# Patient Record
Sex: FEMALE | Race: WHITE | NOT HISPANIC OR LATINO | Employment: OTHER | URBAN - METROPOLITAN AREA
[De-identification: names, ages, dates, MRNs, and addresses within clinical notes are randomized per-mention and may not be internally consistent; named-entity substitution may affect disease eponyms.]

---

## 2017-05-27 ENCOUNTER — HOSPITAL ENCOUNTER (INPATIENT)
Facility: HOSPITAL | Age: 82
LOS: 2 days | Discharge: NURSING, REHAB OR LONG-TERM CARE | DRG: 871 | End: 2017-05-29
Attending: EMERGENCY MEDICINE | Admitting: EMERGENCY MEDICINE
Payer: MEDICARE

## 2017-05-27 DIAGNOSIS — Z71.89 GOALS OF CARE, COUNSELING/DISCUSSION: ICD-10-CM

## 2017-05-27 DIAGNOSIS — N17.9 AKI (ACUTE KIDNEY INJURY): ICD-10-CM

## 2017-05-27 DIAGNOSIS — R11.0 NAUSEA: ICD-10-CM

## 2017-05-27 DIAGNOSIS — I10 ESSENTIAL HYPERTENSION: ICD-10-CM

## 2017-05-27 DIAGNOSIS — R65.20 SEVERE SEPSIS: ICD-10-CM

## 2017-05-27 DIAGNOSIS — Z51.5 PALLIATIVE CARE ENCOUNTER: ICD-10-CM

## 2017-05-27 DIAGNOSIS — A41.9 SEVERE SEPSIS: ICD-10-CM

## 2017-05-27 DIAGNOSIS — E87.0 HYPERNATREMIA: ICD-10-CM

## 2017-05-27 DIAGNOSIS — R09.02 HYPOXIA: ICD-10-CM

## 2017-05-27 DIAGNOSIS — R52 PAIN: ICD-10-CM

## 2017-05-27 DIAGNOSIS — R06.03 RESPIRATORY DISTRESS: Primary | ICD-10-CM

## 2017-05-27 LAB
ABO + RH BLD: NORMAL
ALBUMIN SERPL BCP-MCNC: 3.6 G/DL
ALLENS TEST: ABNORMAL
ALP SERPL-CCNC: 69 U/L
ALT SERPL W/O P-5'-P-CCNC: 16 U/L
ANION GAP SERPL CALC-SCNC: 20 MMOL/L
APTT BLDCRRT: <21 SEC
AST SERPL-CCNC: 41 U/L
BASOPHILS # BLD AUTO: 0.02 K/UL
BASOPHILS NFR BLD: 0.2 %
BILIRUB SERPL-MCNC: 1.1 MG/DL
BLD GP AB SCN CELLS X3 SERPL QL: NORMAL
BNP SERPL-MCNC: 268 PG/ML
BUN SERPL-MCNC: 57 MG/DL
CALCIUM SERPL-MCNC: 9.9 MG/DL
CHLORIDE SERPL-SCNC: 110 MMOL/L
CO2 SERPL-SCNC: 22 MMOL/L
CORTIS SERPL-MCNC: 38.4 UG/DL
CREAT SERPL-MCNC: 1.6 MG/DL
DELSYS: ABNORMAL
DIFFERENTIAL METHOD: ABNORMAL
EOSINOPHIL # BLD AUTO: 0 K/UL
EOSINOPHIL NFR BLD: 0.1 %
EP: 5
ERYTHROCYTE [DISTWIDTH] IN BLOOD BY AUTOMATED COUNT: 17.7 %
EST. GFR  (AFRICAN AMERICAN): 30.5 ML/MIN/1.73 M^2
EST. GFR  (NON AFRICAN AMERICAN): 26.4 ML/MIN/1.73 M^2
FIO2: 100
GLUCOSE SERPL-MCNC: 127 MG/DL
HCO3 UR-SCNC: 23.8 MMOL/L (ref 24–28)
HCT VFR BLD AUTO: 44.8 %
HGB BLD-MCNC: 14.1 G/DL
INR PPP: 1.4
IP: 10
LACTATE SERPL-SCNC: 3.3 MMOL/L
LIPASE SERPL-CCNC: 10 U/L
LYMPHOCYTES # BLD AUTO: 0.8 K/UL
LYMPHOCYTES NFR BLD: 7.7 %
MAGNESIUM SERPL-MCNC: 2.6 MG/DL
MCH RBC QN AUTO: 30.9 PG
MCHC RBC AUTO-ENTMCNC: 31.5 %
MCV RBC AUTO: 98 FL
MODE: ABNORMAL
MONOCYTES # BLD AUTO: 0.5 K/UL
MONOCYTES NFR BLD: 4.5 %
NEUTROPHILS # BLD AUTO: 8.7 K/UL
NEUTROPHILS NFR BLD: 86.9 %
PCO2 BLDA: 42 MMHG (ref 35–45)
PH SMN: 7.36 [PH] (ref 7.35–7.45)
PHOSPHATE SERPL-MCNC: 5.2 MG/DL
PLATELET # BLD AUTO: 172 K/UL
PMV BLD AUTO: 9.4 FL
PO2 BLDA: 363 MMHG (ref 80–100)
POC BE: -2 MMOL/L
POC SATURATED O2: 100 % (ref 95–100)
POC TCO2: 25 MMOL/L (ref 23–27)
POTASSIUM SERPL-SCNC: 4.2 MMOL/L
PROCALCITONIN SERPL IA-MCNC: 0.18 NG/ML
PROT SERPL-MCNC: 7.9 G/DL
PROTHROMBIN TIME: 14.3 SEC
RBC # BLD AUTO: 4.56 M/UL
SAMPLE: ABNORMAL
SITE: ABNORMAL
SODIUM SERPL-SCNC: 152 MMOL/L
T4 FREE SERPL-MCNC: 1.04 NG/DL
TROPONIN I SERPL DL<=0.01 NG/ML-MCNC: 0.24 NG/ML
TSH SERPL DL<=0.005 MIU/L-ACNC: 13.44 UIU/ML
WBC # BLD AUTO: 10 K/UL

## 2017-05-27 PROCEDURE — 63600175 PHARM REV CODE 636 W HCPCS

## 2017-05-27 PROCEDURE — 82533 TOTAL CORTISOL: CPT

## 2017-05-27 PROCEDURE — 27000190 HC CPAP FULL FACE MASK W/VALVE

## 2017-05-27 PROCEDURE — 27100171 HC OXYGEN HIGH FLOW UP TO 24 HOURS

## 2017-05-27 PROCEDURE — 96375 TX/PRO/DX INJ NEW DRUG ADDON: CPT

## 2017-05-27 PROCEDURE — 94660 CPAP INITIATION&MGMT: CPT

## 2017-05-27 PROCEDURE — 84100 ASSAY OF PHOSPHORUS: CPT

## 2017-05-27 PROCEDURE — 86850 RBC ANTIBODY SCREEN: CPT

## 2017-05-27 PROCEDURE — 87400 INFLUENZA A/B EACH AG IA: CPT

## 2017-05-27 PROCEDURE — 83690 ASSAY OF LIPASE: CPT

## 2017-05-27 PROCEDURE — 36600 WITHDRAWAL OF ARTERIAL BLOOD: CPT

## 2017-05-27 PROCEDURE — 12000002 HC ACUTE/MED SURGE SEMI-PRIVATE ROOM

## 2017-05-27 PROCEDURE — 96372 THER/PROPH/DIAG INJ SC/IM: CPT

## 2017-05-27 PROCEDURE — 83880 ASSAY OF NATRIURETIC PEPTIDE: CPT

## 2017-05-27 PROCEDURE — 25000003 PHARM REV CODE 250: Performed by: EMERGENCY MEDICINE

## 2017-05-27 PROCEDURE — 84145 PROCALCITONIN (PCT): CPT

## 2017-05-27 PROCEDURE — 83050 HGB METHEMOGLOBIN QUAN: CPT

## 2017-05-27 PROCEDURE — 85610 PROTHROMBIN TIME: CPT

## 2017-05-27 PROCEDURE — 99285 EMERGENCY DEPT VISIT HI MDM: CPT | Mod: 25

## 2017-05-27 PROCEDURE — 99900035 HC TECH TIME PER 15 MIN (STAT)

## 2017-05-27 PROCEDURE — 80053 COMPREHEN METABOLIC PANEL: CPT

## 2017-05-27 PROCEDURE — 63600175 PHARM REV CODE 636 W HCPCS: Performed by: STUDENT IN AN ORGANIZED HEALTH CARE EDUCATION/TRAINING PROGRAM

## 2017-05-27 PROCEDURE — 93010 ELECTROCARDIOGRAM REPORT: CPT | Mod: ,,, | Performed by: INTERNAL MEDICINE

## 2017-05-27 PROCEDURE — 81001 URINALYSIS AUTO W/SCOPE: CPT

## 2017-05-27 PROCEDURE — 96361 HYDRATE IV INFUSION ADD-ON: CPT

## 2017-05-27 PROCEDURE — 81003 URINALYSIS AUTO W/O SCOPE: CPT

## 2017-05-27 PROCEDURE — 86900 BLOOD TYPING SEROLOGIC ABO: CPT

## 2017-05-27 PROCEDURE — 84484 ASSAY OF TROPONIN QUANT: CPT

## 2017-05-27 PROCEDURE — 84439 ASSAY OF FREE THYROXINE: CPT

## 2017-05-27 PROCEDURE — 85025 COMPLETE CBC W/AUTO DIFF WBC: CPT

## 2017-05-27 PROCEDURE — 83735 ASSAY OF MAGNESIUM: CPT

## 2017-05-27 PROCEDURE — 85730 THROMBOPLASTIN TIME PARTIAL: CPT

## 2017-05-27 PROCEDURE — 83605 ASSAY OF LACTIC ACID: CPT

## 2017-05-27 PROCEDURE — 99291 CRITICAL CARE FIRST HOUR: CPT | Mod: ,,, | Performed by: EMERGENCY MEDICINE

## 2017-05-27 PROCEDURE — 84443 ASSAY THYROID STIM HORMONE: CPT

## 2017-05-27 PROCEDURE — 63600175 PHARM REV CODE 636 W HCPCS: Performed by: EMERGENCY MEDICINE

## 2017-05-27 PROCEDURE — 82803 BLOOD GASES ANY COMBINATION: CPT

## 2017-05-27 PROCEDURE — 96365 THER/PROPH/DIAG IV INF INIT: CPT

## 2017-05-27 PROCEDURE — 87040 BLOOD CULTURE FOR BACTERIA: CPT | Mod: 59

## 2017-05-27 RX ORDER — ONDANSETRON 2 MG/ML
4 INJECTION INTRAMUSCULAR; INTRAVENOUS 2 TIMES DAILY PRN
Status: DISCONTINUED | OUTPATIENT
Start: 2017-05-27 | End: 2017-05-29 | Stop reason: HOSPADM

## 2017-05-27 RX ORDER — FUROSEMIDE 10 MG/ML
40 INJECTION INTRAMUSCULAR; INTRAVENOUS
Status: COMPLETED | OUTPATIENT
Start: 2017-05-27 | End: 2017-05-27

## 2017-05-27 RX ORDER — ONDANSETRON 2 MG/ML
INJECTION INTRAMUSCULAR; INTRAVENOUS
Status: COMPLETED
Start: 2017-05-27 | End: 2017-05-27

## 2017-05-27 RX ORDER — ONDANSETRON 2 MG/ML
4 INJECTION INTRAMUSCULAR; INTRAVENOUS
Status: DISCONTINUED | OUTPATIENT
Start: 2017-05-27 | End: 2017-05-27

## 2017-05-27 RX ADMIN — ONDANSETRON 4 MG: 2 INJECTION INTRAMUSCULAR; INTRAVENOUS at 09:05

## 2017-05-27 RX ADMIN — FUROSEMIDE 40 MG: 10 INJECTION, SOLUTION INTRAVENOUS at 10:05

## 2017-05-27 RX ADMIN — PIPERACILLIN AND TAZOBACTAM 4.5 G: 4; .5 INJECTION, POWDER, LYOPHILIZED, FOR SOLUTION INTRAVENOUS; PARENTERAL at 10:05

## 2017-05-28 PROBLEM — J96.01 ACUTE RESPIRATORY FAILURE WITH HYPOXIA: Status: ACTIVE | Noted: 2017-05-28

## 2017-05-28 PROBLEM — G93.41 SEPTIC ENCEPHALOPATHY: Status: ACTIVE | Noted: 2017-05-28

## 2017-05-28 PROBLEM — G93.40 ENCEPHALOPATHY: Status: ACTIVE | Noted: 2017-05-28

## 2017-05-28 PROBLEM — R06.03 RESPIRATORY DISTRESS: Status: RESOLVED | Noted: 2017-05-27 | Resolved: 2017-05-28

## 2017-05-28 PROBLEM — J96.90 RESPIRATORY FAILURE: Status: ACTIVE | Noted: 2017-05-28

## 2017-05-28 PROBLEM — G93.41 SEPTIC ENCEPHALOPATHY: Status: RESOLVED | Noted: 2017-05-28 | Resolved: 2017-05-28

## 2017-05-28 PROBLEM — A41.9 SEVERE SEPSIS: Status: ACTIVE | Noted: 2017-05-28

## 2017-05-28 PROBLEM — E87.0 HYPERNATREMIA: Status: ACTIVE | Noted: 2017-05-28

## 2017-05-28 PROBLEM — R65.20 SEVERE SEPSIS: Status: ACTIVE | Noted: 2017-05-28

## 2017-05-28 PROBLEM — R79.89 ELEVATED TSH: Status: ACTIVE | Noted: 2017-05-28

## 2017-05-28 PROBLEM — N17.9 AKI (ACUTE KIDNEY INJURY): Status: ACTIVE | Noted: 2017-05-28

## 2017-05-28 LAB
ALBUMIN SERPL BCP-MCNC: 2.9 G/DL
ALP SERPL-CCNC: 55 U/L
ALT SERPL W/O P-5'-P-CCNC: 15 U/L
ANION GAP SERPL CALC-SCNC: 16 MMOL/L
ANION GAP SERPL CALC-SCNC: 17 MMOL/L
AST SERPL-CCNC: 40 U/L
BASOPHILS # BLD AUTO: 0.01 K/UL
BASOPHILS NFR BLD: 0.1 %
BILIRUB SERPL-MCNC: 0.8 MG/DL
BUN SERPL-MCNC: 54 MG/DL
BUN SERPL-MCNC: 55 MG/DL
CALCIUM SERPL-MCNC: 8.2 MG/DL
CALCIUM SERPL-MCNC: 8.7 MG/DL
CHLORIDE SERPL-SCNC: 111 MMOL/L
CHLORIDE SERPL-SCNC: 115 MMOL/L
CO2 SERPL-SCNC: 18 MMOL/L
CO2 SERPL-SCNC: 21 MMOL/L
CREAT SERPL-MCNC: 1.5 MG/DL
CREAT SERPL-MCNC: 1.6 MG/DL
DIFFERENTIAL METHOD: ABNORMAL
EOSINOPHIL # BLD AUTO: 0 K/UL
EOSINOPHIL NFR BLD: 0 %
ERYTHROCYTE [DISTWIDTH] IN BLOOD BY AUTOMATED COUNT: 17.8 %
EST. GFR  (AFRICAN AMERICAN): 30.5 ML/MIN/1.73 M^2
EST. GFR  (AFRICAN AMERICAN): 33 ML/MIN/1.73 M^2
EST. GFR  (NON AFRICAN AMERICAN): 26.4 ML/MIN/1.73 M^2
EST. GFR  (NON AFRICAN AMERICAN): 28.6 ML/MIN/1.73 M^2
FLUAV AG SPEC QL IA: NEGATIVE
FLUBV AG SPEC QL IA: NEGATIVE
GLUCOSE SERPL-MCNC: 130 MG/DL
GLUCOSE SERPL-MCNC: 161 MG/DL
HCT VFR BLD AUTO: 43.3 %
HGB BLD-MCNC: 13.5 G/DL
LACTATE SERPL-SCNC: 1.9 MMOL/L
LYMPHOCYTES # BLD AUTO: 0.5 K/UL
LYMPHOCYTES NFR BLD: 4.4 %
MAGNESIUM SERPL-MCNC: 2.1 MG/DL
MCH RBC QN AUTO: 30.8 PG
MCHC RBC AUTO-ENTMCNC: 31.2 %
MCV RBC AUTO: 99 FL
MONOCYTES # BLD AUTO: 0.5 K/UL
MONOCYTES NFR BLD: 4.9 %
NEUTROPHILS # BLD AUTO: 9.9 K/UL
NEUTROPHILS NFR BLD: 90.4 %
PHOSPHATE SERPL-MCNC: 5 MG/DL
PLATELET # BLD AUTO: 166 K/UL
PMV BLD AUTO: 10 FL
POTASSIUM SERPL-SCNC: 3.6 MMOL/L
POTASSIUM SERPL-SCNC: 4.2 MMOL/L
PROT SERPL-MCNC: 6.6 G/DL
RBC # BLD AUTO: 4.38 M/UL
SODIUM SERPL-SCNC: 145 MMOL/L
SODIUM SERPL-SCNC: 153 MMOL/L
SPECIMEN SOURCE: NORMAL
VANCOMYCIN SERPL-MCNC: <1.1 UG/ML
WBC # BLD AUTO: 10.89 K/UL

## 2017-05-28 PROCEDURE — 83735 ASSAY OF MAGNESIUM: CPT

## 2017-05-28 PROCEDURE — 63600175 PHARM REV CODE 636 W HCPCS: Performed by: INTERNAL MEDICINE

## 2017-05-28 PROCEDURE — 80048 BASIC METABOLIC PNL TOTAL CA: CPT

## 2017-05-28 PROCEDURE — 83605 ASSAY OF LACTIC ACID: CPT

## 2017-05-28 PROCEDURE — 84100 ASSAY OF PHOSPHORUS: CPT

## 2017-05-28 PROCEDURE — 87088 URINE BACTERIA CULTURE: CPT

## 2017-05-28 PROCEDURE — 85025 COMPLETE CBC W/AUTO DIFF WBC: CPT

## 2017-05-28 PROCEDURE — 36415 COLL VENOUS BLD VENIPUNCTURE: CPT

## 2017-05-28 PROCEDURE — 99223 1ST HOSP IP/OBS HIGH 75: CPT | Mod: AI,GC,, | Performed by: HOSPITALIST

## 2017-05-28 PROCEDURE — 87086 URINE CULTURE/COLONY COUNT: CPT

## 2017-05-28 PROCEDURE — 87186 SC STD MICRODIL/AGAR DIL: CPT

## 2017-05-28 PROCEDURE — 25000003 PHARM REV CODE 250: Performed by: STUDENT IN AN ORGANIZED HEALTH CARE EDUCATION/TRAINING PROGRAM

## 2017-05-28 PROCEDURE — 25000003 PHARM REV CODE 250: Performed by: INTERNAL MEDICINE

## 2017-05-28 PROCEDURE — 11000001 HC ACUTE MED/SURG PRIVATE ROOM

## 2017-05-28 PROCEDURE — 87077 CULTURE AEROBIC IDENTIFY: CPT

## 2017-05-28 PROCEDURE — 80053 COMPREHEN METABOLIC PANEL: CPT

## 2017-05-28 PROCEDURE — 80202 ASSAY OF VANCOMYCIN: CPT

## 2017-05-28 RX ORDER — ACETAMINOPHEN 325 MG/1
650 TABLET ORAL EVERY 4 HOURS PRN
Status: DISCONTINUED | OUTPATIENT
Start: 2017-05-28 | End: 2017-05-29 | Stop reason: HOSPADM

## 2017-05-28 RX ORDER — IBUPROFEN 200 MG
24 TABLET ORAL
Status: DISCONTINUED | OUTPATIENT
Start: 2017-05-28 | End: 2017-05-29 | Stop reason: HOSPADM

## 2017-05-28 RX ORDER — DEXTROSE MONOHYDRATE 50 MG/ML
INJECTION, SOLUTION INTRAVENOUS CONTINUOUS
Status: ACTIVE | OUTPATIENT
Start: 2017-05-28 | End: 2017-05-28

## 2017-05-28 RX ORDER — IBUPROFEN 200 MG
16 TABLET ORAL
Status: DISCONTINUED | OUTPATIENT
Start: 2017-05-28 | End: 2017-05-29 | Stop reason: HOSPADM

## 2017-05-28 RX ORDER — DEXTROSE MONOHYDRATE 50 MG/ML
INJECTION, SOLUTION INTRAVENOUS CONTINUOUS
Status: DISCONTINUED | OUTPATIENT
Start: 2017-05-28 | End: 2017-05-29

## 2017-05-28 RX ORDER — GLUCAGON 1 MG
1 KIT INJECTION
Status: DISCONTINUED | OUTPATIENT
Start: 2017-05-28 | End: 2017-05-29 | Stop reason: HOSPADM

## 2017-05-28 RX ADMIN — CEFTRIAXONE 2 G: 2 INJECTION, SOLUTION INTRAVENOUS at 03:05

## 2017-05-28 RX ADMIN — DEXTROSE: 5 SOLUTION INTRAVENOUS at 10:05

## 2017-05-28 RX ADMIN — DEXTROSE: 5 SOLUTION INTRAVENOUS at 09:05

## 2017-05-28 RX ADMIN — VANCOMYCIN HYDROCHLORIDE 750 MG: 750 INJECTION, POWDER, LYOPHILIZED, FOR SOLUTION INTRAVENOUS at 06:05

## 2017-05-28 RX ADMIN — SODIUM CHLORIDE 500 ML: 0.9 INJECTION, SOLUTION INTRAVENOUS at 12:05

## 2017-05-28 NOTE — ED PROVIDER NOTES
Encounter Date: 5/27/2017       History     Chief Complaint   Patient presents with    Shortness of Breath     pt presents to the ed with sob and cyanosis      Review of patient's allergies indicates:   Allergen Reactions    Codeine      HPI   89F brought in from nursing home for cyanosis, shortness of breath and concern for pneumonia. No fevers or chills. Does not provide much history; daughter at bedside reports she is otherwise healthy for her age and has not been sick lately. She was given zithromax prophylactic ally per daughter at the nursing home. The flu is going around the nursing home according to daughter. Unknown exposure to pyridium, bactrim, or reglan.      Past Medical History:   Diagnosis Date    Cardiomegaly     Essential hypertension 9/20/2012    Fracture of right iliac wing 7/28/2016    Fracture of right inferior pubic ramus 7/28/2016    Fracture of right superior pubic ramus 7/28/2016    Multiple closed stable lateral compression fractures of pelvis 7/29/2016    Osteopenia 9/20/2012    Pneumonia      Past Surgical History:   Procedure Laterality Date    HYSTERECTOMY       History reviewed. No pertinent family history.  Social History   Substance Use Topics    Smoking status: Former Smoker    Smokeless tobacco: Former User    Alcohol use No     Review of Systems   Constitutional: Negative for activity change, chills and fever.   HENT: Negative for congestion and sore throat.    Eyes: Negative for photophobia and visual disturbance.   Respiratory: Positive for shortness of breath. Negative for cough.    Cardiovascular: Negative for chest pain and palpitations.   Gastrointestinal: Negative for abdominal pain and anal bleeding.   Genitourinary: Negative for dysuria and flank pain.   Musculoskeletal: Negative for back pain and neck stiffness.   Skin: Negative for rash and wound.   Neurological: Negative for seizures and facial asymmetry.   Psychiatric/Behavioral: Negative for agitation  and hallucinations.       Physical Exam     Initial Vitals   BP Pulse Resp Temp SpO2   05/27/17 1952 05/27/17 1952 05/27/17 1952 -- 05/27/17 1956   (!) 165/41 88 20  (!) 50 %     Physical Exam    Constitutional: She is not diaphoretic. She appears distressed.   Cachectic elderly Cauc female with distal cyanosis of all extremities. Inadequate work of breathing.   HENT:   Head: Normocephalic and atraumatic.   Nose: Nose normal.   Mouth/Throat: Oropharynx is clear and moist. No oropharyngeal exudate.   Eyes: EOM are normal. Pupils are equal, round, and reactive to light. Right eye exhibits no discharge. Left eye exhibits no discharge. No scleral icterus.   +conjunctival pallor   Neck: Normal range of motion. Neck supple. No thyromegaly present. No tracheal deviation present. No JVD present.   Cardiovascular: Normal rate, regular rhythm, normal heart sounds and intact distal pulses. Exam reveals no gallop and no friction rub.    No murmur heard.  Pulmonary/Chest: No stridor. She is in respiratory distress. She has no wheezes. She has no rhonchi. She has no rales. She exhibits no tenderness.   +lower lobe rhonchi bilaterally   Abdominal: Soft. Bowel sounds are normal. She exhibits no distension and no mass. There is no tenderness. There is no rebound and no guarding.   Musculoskeletal: Normal range of motion. She exhibits edema. She exhibits no tenderness.   LE edema to the level of the ankles, symmetric with 1+ pitting. DP intact.    Lymphadenopathy:     She has no cervical adenopathy.   Neurological: She is alert and oriented to person, place, and time. She has normal strength. No cranial nerve deficit or sensory deficit.   Skin: Skin is warm and dry. Capillary refill takes more than 3 seconds. No rash and no abscess noted. No erythema. No pallor.   Psychiatric: She has a normal mood and affect. Thought content normal.         ED Course   Procedures  Labs Reviewed   CBC W/ AUTO DIFFERENTIAL - Abnormal; Notable for the  following:        Result Value    MCHC 31.5 (*)     RDW 17.7 (*)     Gran # 8.7 (*)     Lymph # 0.8 (*)     Gran% 86.9 (*)     Lymph% 7.7 (*)     All other components within normal limits   ISTAT PROCEDURE - Abnormal; Notable for the following:     POC PO2 363 (*)     POC HCO3 23.8 (*)     All other components within normal limits   CULTURE, BLOOD    Narrative:     Aerobic and anaerobic   CULTURE, BLOOD    Narrative:     Aerobic and anaerobic   APTT   B-TYPE NATRIURETIC PEPTIDE   COMPREHENSIVE METABOLIC PANEL   CORTISOL, RANDOM   LACTIC ACID, PLASMA   LIPASE   MAGNESIUM   PHOSPHORUS   PROTIME-INR   PROCALCITONIN   TROPONIN I   TSH   URINALYSIS, REFLEX TO URINE CULTURE   INFLUENZA A AND B ANTIGEN   TYPE & SCREEN                   APC / Resident Notes:   MDM: 89F with cyanosis, increased work of breathing, and mottling noted at nursing home  Initial ddx included but was not limited to: ACS, exacerbation of CHF/pulmonary edema, pleural effusion, cardiac tamponade, pneumonia/consolidation, pneumothorax  CBC WNL  CMP reveals Na 152, BUN/Cr 57/1.6 from 32/1.0 (8/25/16)  Lactate 3.3  Mg wnl Phos 5.2  Trop 0.240  TSH 13.435  UA +leukocytes, WBC; given IV zosyn  CXR with mild pulmonary edema and cardiomegaly; no consolidation noted  Per ICU given 40mg lasix for pulmonary edema; placed on bipap initially due to low SpO2 in 60s however poor correlation due to cyanosis; f/u with blood gas which reveals pH wnl and CO2 wnl but elevated O2. Pt began to aspiration on bipap and weaned down to HFNC to which she responded positively. ICU recommends floor for pt.   Per d/w Medicine CMP and pt presents more c/w dehydration; rec 500cc bolus of NS. They will admit pt.   Lupillo Renee MD  PGY-1 [employer] EM                  ED Course     Clinical Impression:   The primary encounter diagnosis was Respiratory distress. A diagnosis of Hypoxia was also pertinent to this visit.          Lupillo Renee MD  Resident  05/28/17 2506

## 2017-05-28 NOTE — SUBJECTIVE & OBJECTIVE
Past Medical History:   Diagnosis Date    Cardiomegaly     Essential hypertension 9/20/2012    Fracture of right iliac wing 7/28/2016    Fracture of right inferior pubic ramus 7/28/2016    Fracture of right superior pubic ramus 7/28/2016    Multiple closed stable lateral compression fractures of pelvis 7/29/2016    Osteopenia 9/20/2012    Pneumonia        Past Surgical History:   Procedure Laterality Date    HYSTERECTOMY         Review of patient's allergies indicates:   Allergen Reactions    Codeine        Family History     None        Social History Main Topics    Smoking status: Former Smoker    Smokeless tobacco: Former User    Alcohol use No    Drug use: No    Sexual activity: Not on file      Review of Systems   Unable to perform ROS: Mental status change        Objective:     Vital Signs (Most Recent):  Pulse: 102 (05/27/17 2301)  Resp: (!) 37 (05/27/17 2301)  BP: (!) 149/63 (05/27/17 2202)  SpO2: (!) 90 % (05/27/17 2202) Vital Signs (24h Range):  Pulse:  [] 102  Resp:  [20-66] 37  SpO2:  [48 %-90 %] 90 %  BP: (127-165)/(41-87) 149/63   Weight: 59 kg (130 lb)  Body mass index is 23.78 kg/m².    No intake or output data in the 24 hours ending 05/27/17 2358    Physical Exam   Constitutional: She appears well-developed. She appears ill. No distress. Face mask in place.   HENT:   Head: Normocephalic and atraumatic.   Eyes: Pupils are equal, round, and reactive to light. No scleral icterus.   Neck: Normal range of motion. Neck supple. No thyromegaly present.   Cardiovascular: Normal rate and regular rhythm.  Exam reveals gallop.    No murmur heard.  Pulmonary/Chest: Effort normal. She has rhonchi. She has rales.   Abdominal: Soft. Bowel sounds are normal. She exhibits no distension. There is no tenderness.   Lymphadenopathy:     She has no cervical adenopathy.   Neurological: She is alert. No cranial nerve deficit.   Skin: Skin is warm and dry.       Vents:  Oxygen Concentration (%): 40  (05/27/17 2301)  Lines/Drains/Airways     Peripheral Intravenous Line                 Peripheral IV - Single Lumen 05/27/17 2006 Left Forearm less than 1 day              Significant Labs:    CBC/Anemia Profile:    Recent Labs  Lab 05/27/17 2018   WBC 10.00   HGB 14.1   HCT 44.8      MCV 98   RDW 17.7*        Chemistries:    Recent Labs  Lab 05/27/17 2018   *   K 4.2      CO2 22*   BUN 57*   CREATININE 1.6*   CALCIUM 9.9   ALBUMIN 3.6   PROT 7.9   BILITOT 1.1*   ALKPHOS 69   ALT 16   AST 41*   MG 2.6   PHOS 5.2*       ABGs:   Recent Labs  Lab 05/27/17 2045   PH 7.362   PCO2 42.0   HCO3 23.8*   POCSATURATED 100   BE -2     Lactic Acid:   Recent Labs  Lab 05/27/17 2018   LACTATE 3.3*     Urine Culture: No results for input(s): LABURIN in the last 48 hours.  Urine Studies:   Recent Labs  Lab 05/27/17 2128   COLORU Yellow   APPEARANCEUA Cloudy*   PHUR 5.0   SPECGRAV 1.020   PROTEINUA 2+*   GLUCUA Negative   KETONESU Trace*   BILIRUBINUA 1+*   OCCULTUA 1+*   NITRITE Negative   UROBILINOGEN 4.0   LEUKOCYTESUR 2+*   RBCUA 9*   WBCUA 59*   BACTERIA Occasional   HYALINECASTS 5*       Significant Imaging:

## 2017-05-28 NOTE — NURSING
Unable to obtain contiinuous pulse ox, attempted multiple locations, pt extremities cold and cyanotic, MD notified and order changed to every 4 hours, able to obtain pulse ox intermitently on lt earlobe.  Pt color pink, centrally

## 2017-05-28 NOTE — ASSESSMENT & PLAN NOTE
Likely 2/2 to UTI causing sever sepsis  --please see plan for sepsis/UTI  --will cont to monitor

## 2017-05-28 NOTE — ASSESSMENT & PLAN NOTE
Likely hypovolemic hypernatremia   Hypovolemic d/t severe sepsis, dec po intake   S/p 30cc/kg   2.3L water deficit  --hesitant to start cont fluids overnight 2/2 to apparent fluid in the lungs/resp failure  --will cont to monitor closely given encephalopathy, give remaining water boluses in the AM depending on labs

## 2017-05-28 NOTE — ASSESSMENT & PLAN NOTE
Oxygenation sat on monitor not correlating well with blood gas. On examination, patient does not appear distressed or labored, is not using accessory muscles. She sounds a bit rhoncorous and has some scattered rales b/l. CXR does not have any overt focal consolidations, but does appear a little more congested than previous image in 8/2016.   Recommend trial of lasix, 80mg x1.   Agree with antibiotics for possible PNA.   Would check respiratory viral panel, cultures, LA, PCT.  Note patient is DNR/DNI.

## 2017-05-28 NOTE — NURSING
Lab called stating that there was not enough urine to complete the add-on order for a uric nitric urea and a urine osmo. Reported to Dr. Killian and stating that was fine and no orders for a recollect at present time.

## 2017-05-28 NOTE — HPI
"Ms. Draper is a 89 yo F brought in from nursing home for altered mentation and "not being herself". History is provided by daughter and KARIN at bedside. Per KARIN, at baseline, Ms. Draper is "full of piss and vinegar". For the past week, she has been a little slower, a little more lethargic, and has sounded a little more "gurgly, like she has something to cough up but can't". She has not had any complaints. There have been no reports of fevers or chills, sputum production, syncopal episodes, chest pain, n/v, or bowel irregularities. She was given zithromax prophylactic per daughter at the nursing home because the "flu was going around".     On arrival to ED, nursing had difficulty getting adequate pulse oxygenation readings on patient. O2 sats repeatedly low (70s) and patient was placed on BiPAP at 100%. Subsequent ABG, however, reported normal acid-base status with PaO2 >300. During evaluation, Ms. Draper had an episode of emesis and was transitioned off BiPAP to HF.      "

## 2017-05-28 NOTE — CONSULTS
"[facility]  Critical Care Medicine  Consult Note    Patient Name: Lianet Draper  MRN: [id number]  Admission Date: 5/27/2017  Hospital Length of Stay: 1 days  Code Status: Prior  Attending Physician: Wiley Richardson MD   Primary Care Provider: BAR Crow MD   Principal Problem: Altered Mentation    Consults  Subjective:     HPI:  Ms. Draper is a 89 yo F brought in from nursing home for altered mentation and "not being herself". History is provided by daughter and KARIN at bedside. Per KARIN, at baseline, Ms. Draper is "full of piss and vinegar". For the past week, she has been a little slower, a little more lethargic, and has sounded a little more "gurgly, like she has something to cough up but can't". She has not had any complaints. There have been no reports of fevers or chills, sputum production, syncopal episodes, chest pain, n/v, or bowel irregularities. She was given zithromax prophylactic per daughter at the nursing home because the "flu was going around".     On arrival to ED, nursing had difficulty getting adequate pulse oxygenation readings on patient. O2 sats repeatedly low (70s) and patient was placed on BiPAP at 100%. Subsequent ABG, however, reported normal acid-base status with PaO2 >300. During evaluation, Ms. Draper had an episode of emesis and was transitioned off BiPAP to HF.        Hospital/ICU Course:  No notes on file    Past Medical History:   Diagnosis Date    Cardiomegaly     Essential hypertension 9/20/2012    Fracture of right iliac wing 7/28/2016    Fracture of right inferior pubic ramus 7/28/2016    Fracture of right superior pubic ramus 7/28/2016    Multiple closed stable lateral compression fractures of pelvis 7/29/2016    Osteopenia 9/20/2012    Pneumonia        Past Surgical History:   Procedure Laterality Date    HYSTERECTOMY         Review of patient's allergies indicates:   Allergen Reactions    Codeine        Family History     None        Social " History Main Topics    Smoking status: Former Smoker    Smokeless tobacco: Former User    Alcohol use No    Drug use: No    Sexual activity: Not on file      Review of Systems   Unable to perform ROS: Mental status change        Objective:     Vital Signs (Most Recent):  Pulse: 102 (05/27/17 2301)  Resp: (!) 37 (05/27/17 2301)  BP: (!) 149/63 (05/27/17 2202)  SpO2: (!) 90 % (05/27/17 2202) Vital Signs (24h Range):  Pulse:  [] 102  Resp:  [20-66] 37  SpO2:  [48 %-90 %] 90 %  BP: (127-165)/(41-87) 149/63   Weight: 59 kg (130 lb)  Body mass index is 23.78 kg/m².    No intake or output data in the 24 hours ending 05/27/17 2358    Physical Exam   Constitutional: She appears well-developed. She appears ill. No distress. Face mask in place.   HENT:   Head: Normocephalic and atraumatic.   Eyes: Pupils are equal, round, and reactive to light. No scleral icterus.   Neck: Normal range of motion. Neck supple. No thyromegaly present.   Cardiovascular: Normal rate and regular rhythm.  Exam reveals gallop.    No murmur heard.  Pulmonary/Chest: Effort normal. She has rhonchi. She has rales.   Abdominal: Soft. Bowel sounds are normal. She exhibits no distension. There is no tenderness.   Lymphadenopathy:     She has no cervical adenopathy.   Neurological: She is alert. No cranial nerve deficit.   Skin: Skin is warm and dry.       Vents:  Oxygen Concentration (%): 40 (05/27/17 2301)  Lines/Drains/Airways     Peripheral Intravenous Line                 Peripheral IV - Single Lumen 05/27/17 2006 Left Forearm less than 1 day              Significant Labs:    CBC/Anemia Profile:    Recent Labs  Lab 05/27/17 2018   WBC 10.00   HGB 14.1   HCT 44.8      MCV 98   RDW 17.7*        Chemistries:    Recent Labs  Lab 05/27/17 2018   *   K 4.2      CO2 22*   BUN 57*   CREATININE 1.6*   CALCIUM 9.9   ALBUMIN 3.6   PROT 7.9   BILITOT 1.1*   ALKPHOS 69   ALT 16   AST 41*   MG 2.6   PHOS 5.2*       ABGs:   Recent  Labs  Lab 05/27/17 2045   PH 7.362   PCO2 42.0   HCO3 23.8*   POCSATURATED 100   BE -2     Lactic Acid:   Recent Labs  Lab 05/27/17  2018   LACTATE 3.3*     Urine Culture: No results for input(s): LABURIN in the last 48 hours.  Urine Studies:   Recent Labs  Lab 05/27/17 2128   COLORU Yellow   APPEARANCEUA Cloudy*   PHUR 5.0   SPECGRAV 1.020   PROTEINUA 2+*   GLUCUA Negative   KETONESU Trace*   BILIRUBINUA 1+*   OCCULTUA 1+*   NITRITE Negative   UROBILINOGEN 4.0   LEUKOCYTESUR 2+*   RBCUA 9*   WBCUA 59*   BACTERIA Occasional   HYALINECASTS 5*       Significant Imaging:       Assessment/Plan:     Neuro   Septic encephalopathy    Presumed 2°/2 PNA vs UTI, in the setting of senile dementia and hearing loss.   Agree with antibiotics, cultures  Hemodynamically stable and appropriate for Hosp Medicine.         Pulmonary   Respiratory distress    Oxygenation sat on monitor not correlating well with blood gas. On examination, patient does not appear distressed or labored, is not using accessory muscles. She sounds a bit rhoncorous and has some scattered rales b/l. CXR does not have any overt focal consolidations, but does appear a little more congested than previous image in 8/2016.   Recommend trial of lasix, 80mg x1.   Agree with antibiotics for possible PNA.   Would check respiratory viral panel, cultures, LA, PCT.  Note patient is DNR/DNI.               Critical care was time spent personally by me on the following activities: development of treatment plan with patient or surrogate and bedside caregivers, discussions with consultants, evaluation of patient's response to treatment, examination of patient, ordering and performing treatments and interventions, ordering and review of laboratory studies, ordering and review of radiographic studies, pulse oximetry, re-evaluation of patient's condition. This critical care time did not overlap with that of any other provider or involve time for any procedures.    Thank you for  your consult. I will sign off. Please contact us if you have any additional questions.     Uzma Albarran MD  Critical Care Medicine  [facility]

## 2017-05-28 NOTE — ASSESSMENT & PLAN NOTE
2/4 SIRS, source + end organ dmg (lungs, SERINA)  Likely 2/2 to UTI vs PNA vs bacteremia  UA dirty, pending urine culture  Blood cx x2 pending   CXR + for bibasilar effusions, no focal consolidation seen   30cc/kg given  Lactic acid ~3  --will order vanc/rocephin  --will follow up on studies  --pt is clear DNR/DNI, seen by CC and determined stable for the floor  --will consult palliative care

## 2017-05-28 NOTE — ASSESSMENT & PLAN NOTE
Presumed 2°/2 PNA vs UTI, in the setting of senile dementia and hearing loss.   Agree with antibiotics, cultures  Hemodynamically stable and appropriate for Hosp Medicine.

## 2017-05-28 NOTE — SUBJECTIVE & OBJECTIVE
Past Medical History:   Diagnosis Date    Cardiomegaly     Essential hypertension 9/20/2012    Fracture of right iliac wing 7/28/2016    Fracture of right inferior pubic ramus 7/28/2016    Fracture of right superior pubic ramus 7/28/2016    Multiple closed stable lateral compression fractures of pelvis 7/29/2016    Osteopenia 9/20/2012    Pneumonia        Past Surgical History:   Procedure Laterality Date    HYSTERECTOMY         Review of patient's allergies indicates:   Allergen Reactions    Codeine        No current facility-administered medications on file prior to encounter.      Current Outpatient Prescriptions on File Prior to Encounter   Medication Sig    losartan-hydrochlorothiazide 50-12.5 mg (HYZAAR) 50-12.5 mg per tablet Take 1 tablet by mouth once daily.    oxycodone (ROXICODONE) 5 MG immediate release tablet Take 1 tablet (5 mg total) by mouth every 6 (six) hours as needed for Pain.     Family History     None        Social History Main Topics    Smoking status: Former Smoker    Smokeless tobacco: Former User    Alcohol use No    Drug use: No    Sexual activity: Not on file     Review of Systems   Unable to perform ROS: Mental status change     Objective:     Vital Signs (Most Recent):  Pulse: 92 (05/28/17 0046)  Resp: (!) 37 (05/27/17 2301)  BP: 117/79 (05/28/17 0046)  SpO2: 100 % (05/28/17 0010) Vital Signs (24h Range):  Pulse:  [] 92  Resp:  [20-66] 37  SpO2:  [48 %-100 %] 100 %  BP: ()/(37-87) 117/79     Weight: 59 kg (130 lb)  Body mass index is 23.78 kg/m².    Physical Exam   Constitutional: She is oriented to person, place, and time. No distress.   HENT:   Head: Atraumatic.   Mouth/Throat: No oropharyngeal exudate.   Eyes: EOM are normal. Pupils are equal, round, and reactive to light. No scleral icterus.   Neck: Normal range of motion. No JVD present.   Cardiovascular: Regular rhythm, normal heart sounds and intact distal pulses.  Exam reveals no friction rub.    No  murmur heard.  Tachycardic     Pulmonary/Chest: Effort normal. No respiratory distress. She has no wheezes. She has rales.   Abdominal: Soft. Bowel sounds are normal. She exhibits no distension. There is no tenderness. There is no rebound and no guarding.   Musculoskeletal: Normal range of motion. She exhibits no edema or tenderness.   Lymphadenopathy:     She has no cervical adenopathy.   Neurological: She is alert and oriented to person, place, and time. No cranial nerve deficit.   Skin: Skin is warm.        Significant Labs: All pertinent labs within the past 24 hours have been reviewed.    Significant Imaging: I have reviewed and interpreted all pertinent imaging results/findings within the past 24 hours.

## 2017-05-28 NOTE — NURSING
Pt arrived via stretcher with family at this time. Oxygen in place. Resp unlabored at present. No distress or complaints of pain noted at present time. Placed in bed with side rails up for safety.

## 2017-05-28 NOTE — ASSESSMENT & PLAN NOTE
Likely 2/2 to severe sepsis d/t UTI vs hypernatremia vs hypothyroidism  --please refer to plan for each  --will cont to monitor   --no concern for acute neurological issues per this providers physical exam

## 2017-05-28 NOTE — ASSESSMENT & PLAN NOTE
Likely 2/2 to severe sepsis d/t UTI vs dehydration/dec po intake  Urine studies ordered  S/p 30cc/kg  --will cont to monitor with daily labs

## 2017-05-28 NOTE — ASSESSMENT & PLAN NOTE
O2 sats 70's on arrival   On ABG oxygenating fine  Transitioned from BiPAP to HFNC s/p vomiting in mask, currently stable   Will place on aspiration prescautions  S/p x1 dose of lasix by CC, will not cont doses of lasix given that pt is septic   --will cont to monitor

## 2017-05-28 NOTE — HPI
"89 yo F with hx of HTN and osteopenia presents to [facility] from ED from nursing home w/ complaints of AMS for the past x1 week. Additionally, it was reported that pt was lethargic, "gurgly" and coughing. There have been no reports of fevers or chills, sputum production, syncopal episodes, chest pain, n/v, or bowel irregularities. She was given zithromax prophylactic per daughter at the nursing home because the "flu was going around".     On arrival to ED, nursing had difficulty getting adequate pulse oxygenation readings on patient. O2 sats repeatedly low (70s) and patient was placed on BiPAP at 100%. Subsequent ABG, however, reported normal acid-base status with PaO2 >300. During evaluation, Ms. Draper had an episode of emesis and was transitioned off BiPAP to HF. Currently doing fine on HF.     ICU was consulted, deemed stable for the floor.   "

## 2017-05-28 NOTE — ASSESSMENT & PLAN NOTE
In pt with normal TSH in the past  Likely 2/2 to sever sepsis/current illness  --will follow up in clinic

## 2017-05-28 NOTE — ED TRIAGE NOTES
Patient reports to ED with c/c of SOB and possible aspiration. EMS reports that the nurse at [facility] noticed that she was gurgling recently with crackling in the lower bases, mottling of the skin, and cyanosis of fingers of BUE.     EMS reports that patient was recently diagnosed with right lower lobe pneumonia and Cardiomegaly earlier this year.

## 2017-05-28 NOTE — PLAN OF CARE
Problem: Patient Care Overview  Goal: Plan of Care Review  Patient resting quietly in bed with family at bedside. No apparent distress noted at present time. Respirations unlabored, skin cool and dry to touch. Oxygen provided per nasal canula. Patient brief changed and turned. Bed low and call light in reach. Will continue to monitor.

## 2017-05-28 NOTE — PLAN OF CARE
Problem: Patient Care Overview  Goal: Plan of Care Review  Outcome: Ongoing (interventions implemented as appropriate)  AA&O to person, not place, toime or situation.  Tolerated antibiotics and IVF well, tolerated positioning well, resp nonlabored this shift, remains free from falls, able to swallow puree food well, Living will placed on chjart, family remains at bedside, no significant events this shift.

## 2017-05-28 NOTE — H&P
"[facility] Hospital Medicine  History & Physical    Patient Name: Lianet Draper  MRN: [id number]  Admission Date: 5/27/2017  Attending Physician: Tamanna Katz MD   Primary Care Provider: BAR Crow MD    [facility] Medicine Team: [facility] HOSP MED 1 Denny Fisher MD     Patient information was obtained from patient, relative(s) and ER records.     Subjective:     Principal Problem: AMS   Chief Complaint:   Chief Complaint   Patient presents with    Shortness of Breath     pt presents to the ed with sob and cyanosis         HPI: 89 yo F with hx of HTN and osteopenia presents to [facility] from ED from nursing home w/ complaints of AMS for the past x1 week. Additionally, it was reported that pt was lethargic, "gurgly" and coughing. There have been no reports of fevers or chills, sputum production, syncopal episodes, chest pain, n/v, or bowel irregularities. She was given zithromax prophylactic per daughter at the nursing home because the "flu was going around".     On arrival to ED, nursing had difficulty getting adequate pulse oxygenation readings on patient. O2 sats repeatedly low (70s) and patient was placed on BiPAP at 100%. Subsequent ABG, however, reported normal acid-base status with PaO2 >300. During evaluation, Ms. Draper had an episode of emesis and was transitioned off BiPAP to HF. Currently doing fine on HF.     ICU was consulted, deemed stable for the floor.     Past Medical History:   Diagnosis Date    Cardiomegaly     Essential hypertension 9/20/2012    Fracture of right iliac wing 7/28/2016    Fracture of right inferior pubic ramus 7/28/2016    Fracture of right superior pubic ramus 7/28/2016    Multiple closed stable lateral compression fractures of pelvis 7/29/2016    Osteopenia 9/20/2012    Pneumonia        Past Surgical History:   Procedure Laterality Date    HYSTERECTOMY         Review of patient's allergies indicates:   Allergen Reactions    Codeine        No " current facility-administered medications on file prior to encounter.      Current Outpatient Prescriptions on File Prior to Encounter   Medication Sig    losartan-hydrochlorothiazide 50-12.5 mg (HYZAAR) 50-12.5 mg per tablet Take 1 tablet by mouth once daily.    oxycodone (ROXICODONE) 5 MG immediate release tablet Take 1 tablet (5 mg total) by mouth every 6 (six) hours as needed for Pain.     Family History     None        Social History Main Topics    Smoking status: Former Smoker    Smokeless tobacco: Former User    Alcohol use No    Drug use: No    Sexual activity: Not on file     Review of Systems   Unable to perform ROS: Mental status change     Objective:     Vital Signs (Most Recent):  Pulse: 92 (05/28/17 0046)  Resp: (!) 37 (05/27/17 2301)  BP: 117/79 (05/28/17 0046)  SpO2: 100 % (05/28/17 0010) Vital Signs (24h Range):  Pulse:  [] 92  Resp:  [20-66] 37  SpO2:  [48 %-100 %] 100 %  BP: ()/(37-87) 117/79     Weight: 59 kg (130 lb)  Body mass index is 23.78 kg/m².    Physical Exam   Constitutional: She is oriented to person, place, and time. No distress.   HENT:   Head: Atraumatic.   Mouth/Throat: No oropharyngeal exudate.   Eyes: EOM are normal. Pupils are equal, round, and reactive to light. No scleral icterus.   Neck: Normal range of motion. No JVD present.   Cardiovascular: Regular rhythm, normal heart sounds and intact distal pulses.  Exam reveals no friction rub.    No murmur heard.  Tachycardic     Pulmonary/Chest: Effort normal. No respiratory distress. She has no wheezes. She has rales.   Abdominal: Soft. Bowel sounds are normal. She exhibits no distension. There is no tenderness. There is no rebound and no guarding.   Musculoskeletal: Normal range of motion. She exhibits no edema or tenderness.   Lymphadenopathy:     She has no cervical adenopathy.   Neurological: She is alert and oriented to person, place, and time. No cranial nerve deficit.   Skin: Skin is warm.         Significant Labs: All pertinent labs within the past 24 hours have been reviewed.    Significant Imaging: I have reviewed and interpreted all pertinent imaging results/findings within the past 24 hours.    Assessment/Plan:     Encephalopathy    Likely 2/2 to severe sepsis d/t UTI vs hypernatremia vs hypothyroidism  --please refer to plan for each  --will cont to monitor   --no concern for acute neurological issues per this providers physical exam           Elevated TSH    In pt with normal TSH in the past  Likely 2/2 to sever sepsis/current illness  --will follow up in clinic           Hypernatremia    Likely hypovolemic hypernatremia   Hypovolemic d/t severe sepsis, dec po intake   S/p 30cc/kg   2.3L water deficit  --hesitant to start cont fluids overnight 2/2 to apparent fluid in the lungs/resp failure  --will cont to monitor closely given encephalopathy, give remaining water boluses in the AM depending on labs           SERINA (acute kidney injury)    Likely 2/2 to severe sepsis d/t UTI vs dehydration/dec po intake  Urine studies ordered  S/p 30cc/kg  --will cont to monitor with daily labs           Respiratory failure    O2 sats 70's on arrival   On ABG oxygenating fine  Transitioned from BiPAP to HFNC s/p vomiting in mask, currently stable   Will place on aspiration prescautions  S/p x1 dose of lasix by CC, will not cont doses of lasix given that pt is septic   --will cont to monitor          Severe sepsis    2/4 SIRS, source + end organ dmg (lungs, SERINA)  Likely 2/2 to UTI vs PNA vs bacteremia  UA dirty, pending urine culture  Blood cx x2 pending   CXR + for bibasilar effusions, no focal consolidation seen   30cc/kg given  Lactic acid ~3  --will order vanc/rocephin  --will follow up on studies  --pt is clear DNR/DNI, seen by CC and determined stable for the floor  --will consult palliative care               Essential hypertension    Will not order home dose medications 2/2 to ongoing concern for sepsis               VTE Risk Mitigation         Ordered     Medium Risk of VTE  Once      05/28/17 0201     Place sequential compression device  Until discontinued      05/28/17 0201     Place ROSALVA hose  Until discontinued      05/28/17 0201        Denny Fisher MD  Department of Hospital Medicine   [facility]

## 2017-05-29 VITALS
HEART RATE: 91 BPM | WEIGHT: 91.5 LBS | HEIGHT: 60 IN | DIASTOLIC BLOOD PRESSURE: 53 MMHG | SYSTOLIC BLOOD PRESSURE: 106 MMHG | TEMPERATURE: 99 F | RESPIRATION RATE: 17 BRPM | BODY MASS INDEX: 17.96 KG/M2 | OXYGEN SATURATION: 82 %

## 2017-05-29 PROBLEM — R11.0 NAUSEA: Status: ACTIVE | Noted: 2017-05-29

## 2017-05-29 PROBLEM — R52 PAIN: Status: ACTIVE | Noted: 2017-05-29

## 2017-05-29 LAB
BACTERIA #/AREA URNS AUTO: ABNORMAL /HPF
BASOPHILS # BLD AUTO: 0.01 K/UL
BASOPHILS NFR BLD: 0.1 %
BILIRUB UR QL STRIP: ABNORMAL
CLARITY UR REFRACT.AUTO: ABNORMAL
COLOR UR AUTO: YELLOW
DIFFERENTIAL METHOD: ABNORMAL
EOSINOPHIL # BLD AUTO: 0.2 K/UL
EOSINOPHIL NFR BLD: 1.8 %
ERYTHROCYTE [DISTWIDTH] IN BLOOD BY AUTOMATED COUNT: 17.4 %
GLUCOSE UR QL STRIP: NEGATIVE
HCT VFR BLD AUTO: 35.1 %
HGB BLD-MCNC: 11.2 G/DL
HGB UR QL STRIP: ABNORMAL
HYALINE CASTS UR QL AUTO: 5 /LPF
KETONES UR QL STRIP: ABNORMAL
LEUKOCYTE ESTERASE UR QL STRIP: ABNORMAL
LYMPHOCYTES # BLD AUTO: 1 K/UL
LYMPHOCYTES NFR BLD: 9.3 %
MCH RBC QN AUTO: 30.9 PG
MCHC RBC AUTO-ENTMCNC: 31.9 %
MCV RBC AUTO: 97 FL
MICROSCOPIC COMMENT: ABNORMAL
MONOCYTES # BLD AUTO: 0.7 K/UL
MONOCYTES NFR BLD: 6.4 %
NEUTROPHILS # BLD AUTO: 8.9 K/UL
NEUTROPHILS NFR BLD: 82 %
NITRITE UR QL STRIP: NEGATIVE
PH UR STRIP: 5 [PH] (ref 5–8)
PLATELET # BLD AUTO: 122 K/UL
PMV BLD AUTO: 9.9 FL
PROT UR QL STRIP: ABNORMAL
RBC # BLD AUTO: 3.62 M/UL
RBC #/AREA URNS AUTO: 9 /HPF (ref 0–4)
SP GR UR STRIP: 1.02 (ref 1–1.03)
URN SPEC COLLECT METH UR: ABNORMAL
UROBILINOGEN UR STRIP-ACNC: 4 EU/DL
WBC # BLD AUTO: 10.89 K/UL
WBC #/AREA URNS AUTO: 59 /HPF (ref 0–5)
WBC CLUMPS UR QL AUTO: ABNORMAL

## 2017-05-29 PROCEDURE — 99239 HOSP IP/OBS DSCHRG MGMT >30: CPT | Mod: GC,,, | Performed by: HOSPITALIST

## 2017-05-29 PROCEDURE — 85025 COMPLETE CBC W/AUTO DIFF WBC: CPT

## 2017-05-29 PROCEDURE — 63600175 PHARM REV CODE 636 W HCPCS: Performed by: STUDENT IN AN ORGANIZED HEALTH CARE EDUCATION/TRAINING PROGRAM

## 2017-05-29 PROCEDURE — G8996 SWALLOW CURRENT STATUS: HCPCS | Mod: CN

## 2017-05-29 PROCEDURE — 99223 1ST HOSP IP/OBS HIGH 75: CPT | Mod: S$GLB,,, | Performed by: CLINICAL NURSE SPECIALIST

## 2017-05-29 PROCEDURE — 25000003 PHARM REV CODE 250: Performed by: STUDENT IN AN ORGANIZED HEALTH CARE EDUCATION/TRAINING PROGRAM

## 2017-05-29 PROCEDURE — 25000003 PHARM REV CODE 250: Performed by: HOSPITALIST

## 2017-05-29 PROCEDURE — 92610 EVALUATE SWALLOWING FUNCTION: CPT

## 2017-05-29 PROCEDURE — 36415 COLL VENOUS BLD VENIPUNCTURE: CPT

## 2017-05-29 PROCEDURE — 97161 PT EVAL LOW COMPLEX 20 MIN: CPT

## 2017-05-29 PROCEDURE — 97166 OT EVAL MOD COMPLEX 45 MIN: CPT

## 2017-05-29 PROCEDURE — G8997 SWALLOW GOAL STATUS: HCPCS | Mod: CL

## 2017-05-29 RX ORDER — AMOXICILLIN AND CLAVULANATE POTASSIUM 500; 125 MG/1; MG/1
1 TABLET, FILM COATED ORAL 2 TIMES DAILY
Status: DISCONTINUED | OUTPATIENT
Start: 2017-05-29 | End: 2017-05-29 | Stop reason: HOSPADM

## 2017-05-29 RX ORDER — AMOXICILLIN AND CLAVULANATE POTASSIUM 500; 125 MG/1; MG/1
1 TABLET, FILM COATED ORAL 2 TIMES DAILY
Qty: 28 TABLET | Refills: 0 | Status: SHIPPED | OUTPATIENT
Start: 2017-05-29

## 2017-05-29 RX ORDER — AMOXICILLIN AND CLAVULANATE POTASSIUM 875; 125 MG/1; MG/1
1 TABLET, FILM COATED ORAL EVERY 12 HOURS
Status: DISCONTINUED | OUTPATIENT
Start: 2017-05-29 | End: 2017-05-29

## 2017-05-29 RX ORDER — HEPARIN SODIUM 5000 [USP'U]/ML
5000 INJECTION, SOLUTION INTRAVENOUS; SUBCUTANEOUS EVERY 8 HOURS
Status: DISCONTINUED | OUTPATIENT
Start: 2017-05-29 | End: 2017-05-29

## 2017-05-29 RX ORDER — HEPARIN SODIUM 5000 [USP'U]/ML
5000 INJECTION, SOLUTION INTRAVENOUS; SUBCUTANEOUS EVERY 12 HOURS
Status: DISCONTINUED | OUTPATIENT
Start: 2017-05-29 | End: 2017-05-29

## 2017-05-29 RX ORDER — AMOXICILLIN AND CLAVULANATE POTASSIUM 875; 125 MG/1; MG/1
1 TABLET, FILM COATED ORAL EVERY 12 HOURS
Qty: 24 TABLET | Refills: 0 | Status: SHIPPED | OUTPATIENT
Start: 2017-05-29 | End: 2017-05-29 | Stop reason: HOSPADM

## 2017-05-29 RX ORDER — ERGOCALCIFEROL 1.25 MG/1
50000 CAPSULE ORAL
COMMUNITY

## 2017-05-29 RX ORDER — MIRTAZAPINE 30 MG/1
30 TABLET, FILM COATED ORAL NIGHTLY
COMMUNITY

## 2017-05-29 RX ADMIN — DEXTROSE 1000 ML: 5 SOLUTION INTRAVENOUS at 04:05

## 2017-05-29 RX ADMIN — Medication 500 MG: at 06:05

## 2017-05-29 RX ADMIN — CEFTRIAXONE 1 G: 1 INJECTION, SOLUTION INTRAVENOUS at 04:05

## 2017-05-29 RX ADMIN — HEPARIN SODIUM 5000 UNITS: 5000 INJECTION, SOLUTION INTRAVENOUS; SUBCUTANEOUS at 08:05

## 2017-05-29 NOTE — PLAN OF CARE
Problem: Occupational Therapy Goal  Goal: Occupational Therapy Goal  OT evaluation initiated and completed. Pt is inappropriate for skilled OT services at this time. Pt will require 24 / 7 assistance at D/C.  Outcome: Outcome(s) achieved Date Met: 05/29/17  Alejandro Awad OTR/L      5/29/2017

## 2017-05-29 NOTE — ASSESSMENT & PLAN NOTE
2/4 SIRS, source + end organ dmg (lungs, SERINA)  Likely 2/2 to UTI vs PNA vs bacteremia  - UA 5/27: 2+ leukocytes, occasional bacteria, negative nitrites  - UCx 5/28: in process  - BCx 5/27: aerobic and anaerobic positive; gram + cocci in chains resembling Strep  - Repeat BCx 5/29 for clearance evaluation   CXR + for bibasilar effusions, no focal consolidation seen   30cc/kg given  - initial lactic acid 3.0>1.9 5/28  - continue vanc/rocephin  - DNR/DNI, seen by CC and determined stable for the floor  - palliative care consulted; appreciate recs

## 2017-05-29 NOTE — PHYSICIAN QUERY
PT Name: Lianet Draper  MR #: [id number]     Physician Query Form - Documentation Clarification      CDS/: Nancy Yuen RN, CCDS               Contact information:  [email or web address]    This form is a permanent document in the medical record.     Query Date: May 29, 2017    By submitting this query, we are merely seeking further clarification of documentation. Please utilize your independent clinical judgment when addressing the question(s) below.    The Medical record reflects the following:    Supporting Clinical Findings Location in Medical Record   Pressure Ulcer 05/28/17 0155 Left buttocks Stage II    Pressure Ulcer Properties Date First Assessed: 05/28/17   Time First Assessed: 0155   Pressure Ulcer Present on Admission: yes  Side: Left    Location: buttocks   Staging: Stage II           Adult PCS body system flow sheet      Adult PCS body system flow sheet                                                                                      Doctor, Please specify diagnosis or diagnoses associated with above clinical findings.    Provider Use Only      [X] Agree with the nurses assessment of Pressure Ulcer 05/28/17 0155 Left buttocks Stage II        Present on admission:  [ X] Yes or [   ] No or [   ] Clinically undetermined    [   ] Do not agree with the nurses assessment of Pressure Ulcer 05/28/17 0155 Left buttocks Stage II    (specify) ________________        Present on admission:  [   ] Yes or [   ] No or [   ] Clinically undetermined    [   ] Other (specify) ________________        Present on admission:  [   ] Yes or [   ] No or [   ] Clinically undetermined    [   ] Clinically undetermined                                                                                                               [  ] Clinically undetermined

## 2017-05-29 NOTE — SUBJECTIVE & OBJECTIVE
Interval History: NAEON; patient afebrile. Issues with pulse ox readings throughout the night. Patient somnolent this morning with minimal response to name or sternal rub. Currently on 10L HF.    Review of Systems   Unable to perform ROS: Mental status change     Objective:     Vital Signs (Most Recent):  Temp: 97.1 °F (36.2 °C) (05/29/17 0400)  Pulse: 95 (05/29/17 0400)  Resp: 18 (05/29/17 0400)  BP: (!) 119/52 (05/29/17 0400)  SpO2: (!) 81 % (05/29/17 0400) Vital Signs (24h Range):  Temp:  [97.1 °F (36.2 °C)-98.3 °F (36.8 °C)] 97.1 °F (36.2 °C)  Pulse:  [] 95  Resp:  [16-20] 18  SpO2:  [32 %-91 %] 81 %  BP: ()/(50-62) 119/52     Weight: 41.5 kg (91 lb 7.9 oz)  Body mass index is 17.87 kg/m².    Intake/Output Summary (Last 24 hours) at 05/29/17 0623  Last data filed at 05/28/17 1359   Gross per 24 hour   Intake              750 ml   Output                0 ml   Net              750 ml      Physical Exam   Constitutional: No distress.   HENT:   Head: Atraumatic.   Mouth/Throat: No oropharyngeal exudate.   Eyes: EOM are normal. Pupils are equal, round, and reactive to light. No scleral icterus.   Neck: Normal range of motion. No JVD present.   Cardiovascular: Regular rhythm, normal heart sounds and intact distal pulses.  Exam reveals no friction rub.    No murmur heard.  Tachycardic     Pulmonary/Chest: Effort normal. No respiratory distress. She has no wheezes. She has rales.   Abdominal: Soft. Bowel sounds are normal. She exhibits no distension. There is no tenderness. There is no rebound and no guarding.   Musculoskeletal: Normal range of motion. She exhibits no edema or tenderness.   Lymphadenopathy:     She has no cervical adenopathy.   Neurological:   Patient awake after persistent attempts; oriented to name only.    Skin: Skin is warm.       Significant Labs:   CBC:   Recent Labs  Lab 05/27/17  2018 05/28/17  0438 05/29/17  0347   WBC 10.00 10.89 10.89   HGB 14.1 13.5 11.2*   HCT 44.8 43.3 35.1*     166 122*     CMP:   Recent Labs  Lab 05/27/17 2018 05/28/17 0438 05/28/17 2055   * 153* 145   K 4.2 4.2 3.6    115* 111*   CO2 22* 21* 18*   * 130* 161*   BUN 57* 55* 54*   CREATININE 1.6* 1.5* 1.6*   CALCIUM 9.9 8.7 8.2*   PROT 7.9 6.6  --    ALBUMIN 3.6 2.9*  --    BILITOT 1.1* 0.8  --    ALKPHOS 69 55  --    AST 41* 40  --    ALT 16 15  --    ANIONGAP 20* 17* 16   EGFRNONAA 26.4* 28.6* 26.4*       Significant Imaging: I have reviewed all pertinent imaging results/findings within the past 24 hours.

## 2017-05-29 NOTE — PLAN OF CARE
"Palliative Care Social Work   Assessment  Name: Lianet Draper  MRN: [id number]  Date of Birth/Age:  1917  Sex: female  Ethnicity:      Attending Physician: Dr. Katz  Reason for Referral: "End of Life/Hospice"  Consult Order Date: 17 1342  Primary : Loren Moser LMSW    Palliative Care Provider: LUKE Ann    Present during Interview: pt sleeping. Pt's three children: Bryce, Tamra and Emily & Palliative Care APRN and this SW    Past & Current Medical Situation:   Diagnosis: Encephalopathy  PMH:   Past Medical History:   Diagnosis Date    Cardiomegaly     Essential hypertension 2012    Fracture of right iliac wing 2016    Fracture of right inferior pubic ramus 2016    Fracture of right superior pubic ramus 2016    Multiple closed stable lateral compression fractures of pelvis 2016    Osteopenia 2012    Pneumonia      Mental Health/Substance Use History: n/a  Non-traditional Health practices: n/a    Understanding of diagnosis and prognosis: Family has good understanding of dx and px.    Patients Mental Status: Sleeping during visit.     Socio-Economic Factors/Resources:  Address: [address] [place] [address]  Phone Number: [phone] (home)     Marital Status:  3rd   in .  3 times.  Household Composition: Lives in [facility].   Children: 3 children: Bryce Garciao II, Tamra Moreno, Emily Moe  Relationships with Family: Pt has good family support. She has 3 children, 5 grandchildren and 2 great grandchildren. 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     Emergency Contacts:   Dtr/POA: Tamra Moreno: [phone]  Dtr/POA: Emily Moe: [phone]      Activities of Daily Living: Assistance with ADLs.  Support Systems-Family & Community (Home Health, HME etc): Been at [facility] since , but lived in " "apartment/independent side until last summer.      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    History: 3rd  [employer]    Financial Resources: [employer]      Advanced Care Planning & Legal Concerns:   Advanced Directives/Living Will: yes Reviewed in chart   Planning:  no    Power of : yes Reviwed in chart. Lists three children as POA  Surrogate Decision Maker:       Spirituality, Culture & Coping Mechanisms:  F- Lulu and Belief: Presybeterian   I - Importance: very  C - Community/Culture Values: n/a  A - Address in Care: Spiritual Care available as needed.      Strengths/Coping Strategies: Strong Lulu. Supportive Family.  Self-Care Activities/Hobbies: Played violin for [employer] when younger, Very social person. "led the second line every where she went".    Goals/Hopes/Expectations: Return to [facility] in Hospice section  Fears/Anxiety/Concerns: Don't want her to be uncomfortable.         Preferences about EOL Environment: (own bed, family nearby, pets, music, etc)  [facility]/Hospice    Complicated Bereavement Risk Assessment Tool (CBRAT)  Reference:  [facility] Clinical Practice Group (May 2016). Bereavement Risk Screening and Management Guidelines.  Retrieved from: http://www.grpcc.com.au/wp-content/uploads//JMATL-Ixonjkqmhst-Hmtxjegnk-and-Management-Guideline-2016.pdf      Client Characteristics (Bereaved Client)  ? Under 18      no  ? Was a Twin   no  ? Young Spouse   no  ? Elderly Spouse    no  ? Isolated    no  ? Lacks Meaningful Social Support   no  ? Dissatisfied with help available during illness   no  ? New to Financial [place] no  ? New to Decision-Making   no   History of Loss (Bereaved Client)  ? Cumulative Multiple Losses   yes  ? Previous Mental Health Illnesses   no  ? Current Mental Health Illness   no  ? Other " Significant Health Issues   no   ? Migrant/Refugee   no    Illness  ? Inherited Disorder   no  ? Stigmatized Disease in the   no  ?  Family/Community   yes  ? Lengthy/Burdensome   yes Relationship with   ? Profound Lifelong Partner   no  ? Highly Dependent    no  ? Antagonistic   no  ? Ambivalent   no  ? Deeply Connected   no  ? Culturally Defined   no   Death  ? Sudden or Unexpected   no  ? Traumatic Circumstances Associated with Death   no  ? Significant Cultural/Social Burdens as a result of Death   no   Risk Factors Scores  0-2  Low  3-5  Moderate  5+  High  All persons scoring moderate to high presume to be at risk**    (** It is acknowledged that protective factors and resilience may outweigh apparent risk factors.      Total Risk Factors Score:   Moderate      Family is well supportive of each other. Will benefit from continued support and bereavement care through hospice agency.           Discharge Planning Needs/Plan of Care:     Pt is a 89 year old female from [facility]. She was admitted with altered mental status, severe sepsis and acute hypoxic respiratory failure.     Pt's children are her Power of Attorneys: Son Bryce, Dtr Emily, Dtr Tamra.     Family has chosen o return to [facility] with Hospice. Family has no other questions.      Emotional Support provided.         mone ePrry, GRACY, ACHP-SW

## 2017-05-29 NOTE — PLAN OF CARE
Problem: SLP Goal  Goal: SLP Goal  Speech Language Pathology Goals  Goals expected to be met by 6/5  1. Pt will participate in ongoing swallow assessment to determine least restrictive diet.          Swallow evaluation completed with initiated POC.    Melissa Chau M.A. CCC-SLP  Speech Language Pathologist  [phone]  5/29/2017

## 2017-05-29 NOTE — PT/OT/SLP EVAL
Physical Therapy  Evaluation/ Discharge    Lianet Draper   MRN: [id number]   Admitting Diagnosis: Encephalopathy    PT Received On: 05/29/17  PT Start Time: 0837     PT Stop Time: 0859    PT Total Time (min): 22 min       Billable Minutes:  Evaluation 22    Diagnosis: Encephalopathy    Past Medical History:   Diagnosis Date    Cardiomegaly     Essential hypertension 9/20/2012    Fracture of right iliac wing 7/28/2016    Fracture of right inferior pubic ramus 7/28/2016    Fracture of right superior pubic ramus 7/28/2016    Multiple closed stable lateral compression fractures of pelvis 7/29/2016    Osteopenia 9/20/2012    Pneumonia       Past Surgical History:   Procedure Laterality Date    HYSTERECTOMY         Referring physician: JUSTYNA Vasquez  Date referred to PT: 05/28/2017    General Precautions: Standard, fall, aspiration  Orthopedic Precautions: N/A   Braces: N/A            Patient History:  Lives With: facility resident  Living Arrangements: residential facility  Home Layout: Able to live on 1st floor  Living Environment Comment: Pt poor historian. Per chart review, pt lives in NH, PLOF unclear.    Previous Level of Function:   pt unable to report    Subjective:  Communicated with RN prior to session.  Pt agreeable to therapy session.     Pain/Comfort  Pain Rating 1: other (see comments) (pt reported pain with mvt but unable report where or rate)      Objective:   Patient found with: pulse ox (continuous), oxygen, peripheral IV, telemetry, SCD     Cognitive Exam:  Oriented to: Person    Follows Commands/attention: Easily distracted, Follows one-step commands and ~25% of time  Communication: limited verbalizations  Safety awareness/insight to disability: impaired    Physical Exam:  Postural examination/scapula alignment: Rounded shoulder    Skin integrity: Visible skin intact  Edema: None noted B LE    Lower Extremity Range of Motion:  Right Lower Extremity: WFL  Left Lower Extremity: WFL    Lower  Extremity Strength:  Right Lower Extremity: MARQUEZ 2* poor command follow; gross 3-/5  Left Lower Extremity: MARQUEZ 2* poor command follow; gross 3-/5     Gross motor coordination: WFL    Functional Mobility:  Bed Mobility:  Supine to Sit: Maximum Assistance  Sit to Supine: Maximum Assistance    Transfers:  Sit <> Stand Assistance: Total Assistance  Sit <> Stand Assistive Device: No Assistive Device    Gait:   Gait Distance: unable to perform    Balance:   Static Sit: FAIR: Maintains without assist, but unable to take any challenges   Dynamic Sit: FAIR: Cannot move trunk without losing balance  Static Stand: 0: Needs MAXIMAL assist to maintain   Dynamic stand: 0: N/A    Therapeutic Activities and Exercises:  Pt educated on role of PT/POC.  Pt sat EOB with CGA for safety 2* impulsive and limited command follow.  Pt stood EOB with total A with post lean.     AM-PAC 6 CLICK MOBILITY  How much help from another person does this patient currently need?   1 = Unable, Total/Dependent Assistance  2 = A lot, Maximum/Moderate Assistance  3 = A little, Minimum/Contact Guard/Supervision  4 = None, Modified [place]/Independent    Turning over in bed (including adjusting bedclothes, sheets and blankets)?: 2  Sitting down on and standing up from a chair with arms (e.g., wheelchair, bedside commode, etc.): 1  Moving from lying on back to sitting on the side of the bed?: 2  Moving to and from a bed to a chair (including a wheelchair)?: 1  Need to walk in hospital room?: 1  Climbing 3-5 steps with a railing?: 1  Total Score: 8     AM-PAC Raw Score CMS G-Code Modifier Level of Impairment Assistance   6 % Total / Unable   7 - 9 CM 80 - 100% Maximal Assist   10 - 14 CL 60 - 80% Moderate Assist   15 - 19 CK 40 - 60% Moderate Assist   20 - 22 CJ 20 - 40% Minimal Assist   23 CI 1-20% SBA / CGA   24 CH 0% Independent/ Mod I     Patient left supine with all lines intact, call button in reach and RN notified.    Assessment:   Lianet KHAN  Bonny is a 89 y.o. female with a medical diagnosis of Encephalopathy. Pt performed bed mobility max A and transfers total A. Pt with limited command follow and unable to attend to task to participate in therapy. Pt inappropriate for skilled PT at this time. Pt will require 24hr A upon d/c.    Rehab identified problem list/impairments: Rehab identified problem list/impairments: weakness, impaired endurance, impaired functional mobilty, impaired balance, decreased coordination, decreased lower extremity function, impaired cognition, decreased safety awareness    Discharge recommendations: Discharge Facility/Level Of Care Needs: nursing facility, basic (return to NH)     Barriers to discharge: Barriers to Discharge: None    Equipment recommendations: Equipment Needed After Discharge: none     GOALS:    Physical Therapy Goals     Not on file          Multidisciplinary Problems (Resolved)        Problem: Physical Therapy Goal    Goal Priority Disciplines Outcome Goal Variances Interventions   Physical Therapy Goal   (Resolved)     PT/OT, PT Outcome(s) achieved                     PLAN:    D/C PT.     PATRICIA CAROLINA, PT  05/29/2017

## 2017-05-29 NOTE — ASSESSMENT & PLAN NOTE
Likely hypovolemic hypernatremia   Hypovolemic d/t severe sepsis, dec po intake   S/p 30cc/kg   - interval improvement following D5; patient resuming PO intake  - will reassess need for further labs after palliative discussion

## 2017-05-29 NOTE — PLAN OF CARE
Problem: Physical Therapy Goal  Goal: Physical Therapy Goal  Outcome: Outcome(s) achieved Date Met: 05/29/17  Pt evaluation complete. Pt inappropriate for skilled PT at this time. Pt will require 24hr A upon d/c.    PATRICIA CAROLINA, PT  5/29/2017

## 2017-05-29 NOTE — PROGRESS NOTES
Pt is ready for discharge , Moshe arrived . Pt is awake ,alert and confused , stable V/S . IV access removed and cardiac monitoring removed . Family at the bedside . Papers given to Wonian team . Pt is on O2 N/C 5 L . Pt left the floor for  chateau  [place] with moshe .

## 2017-05-29 NOTE — PROGRESS NOTES
Spoke to IM1 on call about getting patient to read on pulse ox.  Many attempts have been made including another probe on the forehead.  Patient is taking off her own nasal cannula stating that she does not want it on.

## 2017-05-29 NOTE — ASSESSMENT & PLAN NOTE
- likely 2/2 to severe sepsis d/t UTI vs dehydration/decreased PO intake  - admission Cr 1.6; baseline 0.8-0.9  - s/p 30cc/kg

## 2017-05-29 NOTE — ASSESSMENT & PLAN NOTE
- O2 sats 70's on arrival   - 100 SpO2 on ABG in the ED; inconsistent with pulse oximetry   - Transitioned from BiPAP to HFNC s/p vomiting in mask, currently stable   - continue aspiration prescautions  - will continue to wean off HF

## 2017-05-29 NOTE — PT/OT/SLP EVAL
Occupational Therapy  Evaluation / Discharge Summary    Lianet Draper   MRN: [id number]   Admitting Diagnosis: Encephalopathy    OT Date of Treatment: 05/29/17   OT Start Time: 0845  OT Stop Time: 0905  OT Total Time (min): 20 min    Billable Minutes:  Evaluation 20    Diagnosis: Encephalopathy       Past Medical History:   Diagnosis Date    Cardiomegaly     Essential hypertension 9/20/2012    Fracture of right iliac wing 7/28/2016    Fracture of right inferior pubic ramus 7/28/2016    Fracture of right superior pubic ramus 7/28/2016    Multiple closed stable lateral compression fractures of pelvis 7/29/2016    Osteopenia 9/20/2012    Pneumonia       Past Surgical History:   Procedure Laterality Date    HYSTERECTOMY         Referring physician: Rose Marie  Date referred to OT: 5/28/17    General Precautions: Standard, fall, aspiration, pureed diet  Orthopedic Precautions: N/A  Braces: N/A    Do you have any cultural, spiritual, Uatsdin conflicts, given your current situation?: None stated     Patient History:  Living Environment  Lives With: facility resident  Living Arrangements: assisted living  Home Layout: Able to live on 1st floor  Stair Railings at Home: none  Transportation Available: family or friend will provide  Living Environment Comment: Pt is poor historian but is reported to live in NH secondary to altered mentation..  PLOF is unclear.  Equipment Currently Used at Home:  (Pt is poor historian.)    Prior level of function:      Driving License: No  Mode of Transportation: Family  Occupation: Other (Comment) (Pt is poor historian. Unable to determine PLOF. )     Dominant hand: right    Subjective:  Communicated with nurse prior to session.    Chief Complaint: Encephalopathy  Patient/Family stated goals: None stated    Pain/Comfort  Pain Rating 1:  (Pt reporting pain whenever touched but unable to indicate where or degree of pain.)  Pain Addressed 1: Reposition, Distraction  Pain Rating  Post-Intervention 1:  (Same)    Objective:  Patient found with: pulse ox (continuous), oxygen, peripheral IV, SCD, telemetry    Cognitive Exam:  Oriented to: Person  Follows Commands/attention: Pt unable to follow commands more than ~ 20 percent an is easily distractible  Communication: Pt with few words and inconsistently answering questions presented to her.  Memory:  Pt is poor historian and unable to provide Hx.   Safety awareness/insight to disability: impaired  Coping skills/emotional control: Pt is pleasantly confused.    Visual/perceptual:  Unable to determine    Physical Exam:  Postural examination/scapula alignment: Rounded shoulder and Posterior pelvic tilt  Skin integrity: Visible skin intact  Edema: None noted (B) UEs    Sensation:   Unable to determine    Upper Extremity Range of Motion:  Right Upper Extremity:Pt unable to follow commands to assess AROM but spontaneously moving UE WFLS.  Left Upper Extremity: Pt unable to follow commands to assess AROM but spontaneously moving UE WFLS.    Upper Extremity Strength:  Right Upper Extremity: Grossly 4/5  Left Upper Extremity: Grossly 4/5   Strength: WFLS    Fine motor coordination:   Grossly Intact    Gross motor coordination: WFL    Functional Mobility:  Bed Mobility:  Rolling/Turning to Left: Maximum assistance, With side rail  Rolling/Turning Right: Maximum assistance, With side rail  Scooting/Bridging: Total Assistance, With assist of 2  Supine to Sit: Maximum Assistance, WIth side rail  Sit to Supine: Maximum Assistance, With side rail    Transfers:  Sit <> Stand Assistance: Total Assistance (severe posterior lean demonstrated when standing.  Performed from EOB to standing.)  Sit <> Stand Assistive Device: No Assistive Device    Functional Ambulation: Unable to perform.    Activities of Daily Living:  Feeding Level of Assistance: Activity did not occur (Pt would not eat food which was presented to her )    UE Dressing Level of Assistance: Set-up  "Assistance, Maximum assistance ([facility] gown with Pt sitting EOB with SBA to CGA secondary to confusion.)    LE Dressing Level of Assistance: Total assistance (with (A) to don/doff socks.)    Grooming Position: other (Supine in bed with HOB elevated to 40 degrees.. Pt washing face, and hands only but not on command..)       Balance:   Static Sit: FAIR: Maintains without assist, but unable to take any challenges   Dynamic Sit: FAIR: Cannot move trunk without losing balance  Static Stand: 0: Needs Total assist to maintain with Pt standing with severe posterior lean.  Dynamic stand: POOR: N/A    Therapeutic Activities :  OT evaluation performed.  White board updated.  Pt educated on role of OT, safety with functional mobility and ADLs    AM-PAC 6 CLICK ADL  How much help from another person does this patient currently need?  1 = Unable, Total/Dependent Assistance  2 = A lot, Maximum/Moderate Assistance  3 = A little, Minimum/Contact Guard/Supervision  4 = None, Modified [place]/Independent    Putting on and taking off regular lower body clothing? : 1  Bathing (including washing, rinsing, drying)?: 1  Toileting, which includes using toilet, bedpan, or urinal? : 1  Putting on and taking off regular upper body clothing?: 2  Taking care of personal grooming such as brushing teeth?: 2  Eating meals?: 1  Total Score: 8    AM-PAC Raw Score CMS "G-Code Modifier Level of Impairment Assistance   6 % Total / Unable   7 - 9 CM 80 - 100% Maximal Assist   10-14 CL 60 - 80% Moderate Assist   15 - 19 CK 40 - 60% Moderate Assist   20 - 22 CJ 20 - 40% Minimal Assist   23 CI 1-20% SBA / CGA   24 CH 0% Independent/ Mod I       Patient left HOB elevated with all lines intact, call button in reach and nurse notified    Assessment:  Lianet Draper is a 89 y.o. female with a medical diagnosis of Encephalopathy . Pt tolerated Tx without incident but was unable to consistently follow commands with greater than 20 % " accuracy jayda when presented to commands for self care tasks and functional mobility which   should be second nature.. Pt needing Max to Total (A) to perform all activities. Pt to be D/Gurpreet back to NH or to palliative care facility secondary to inability to participate in therapy session.     Rehab identified problem list/impairments: Rehab identified problem list/impairments: weakness, impaired endurance, impaired self care skills, impaired functional mobilty, gait instability, impaired balance, decreased coordination, impaired cognition, decreased upper extremity function, decreased lower extremity function, decreased safety awareness, pain, decreased ROM, impaired coordination    Rehab potential is poor.    Activity tolerance: Poor    Discharge recommendations: Discharge Facility/Level Of Care Needs:  (Return to NH)     Barriers to discharge: Barriers to Discharge: None    Equipment recommendations: none     GOALS:    Occupational Therapy Goals        Problem: Occupational Therapy Goal    Goal Priority Disciplines Outcome Interventions   Occupational Therapy Goal     OT, PT/OT                     PLAN:  Patient to be seen   to address the above listed problems via  (D/C Pt from IPOT secondary to Pt's inability to follow commands when attempting ADLs and functional transfers/ mobility)  Plan of Care expires:    Plan of Care reviewed with: patient         Alejandro Awad OTR/TRAVIS  05/29/2017

## 2017-05-29 NOTE — PLAN OF CARE
Extended Emergency Contact Information  Primary Emergency Contact: MorenoTamra   [place]  Mobile Phone: [phone]  Relation: Daughter  Secondary Emergency Contact: Emily Moe   [place]  Mobile Phone: [phone]  Relation: Daughter    P Raul Crow MD  [address] [facility] [place] / [place] [id number]    No future appointments.    Payor: HUMANA MANAGED MEDICARE / Plan: HUMANAGOLDPLUS DIABETES & HEART HMO SNP / Product Type: Medicare Advantage /       [facility] [place] [address]. [place], [place] - [address] [address]  Phone: [phone] Fax: [phone]       05/29/17 1101   Discharge Assessment   Assessment Type Discharge Planning Assessment   Confirmed/corrected address and phone number on facesheet? Yes   Assessment information obtained from? Caregiver;Medical Record   Expected Length of Stay (days) 3   Communicated expected length of stay with patient/caregiver yes   Type of Healthcare Directive Received Durable power of  for health care   If Healthcare Directive is received, is it scanned into Epic? yes   Prior to hospitilization cognitive status: Not Oriented to Time;Not Oriented to Place   Prior to hospitalization functional status: Completely Dependent   Current cognitive status: Not Oriented to Place;Not Oriented to Time   Current Functional Status: Completely Dependent   Arrived From nursing home  ([facility])   Lives With facility resident   Able to Return to Prior Arrangements yes   Is patient able to care for self after discharge? No   How many people do you have in your home that can help with your care after discharge? other (see comments)   Patient's perception of discharge disposition nursing home   Readmission Within The Last 30 Days no previous admission in last 30 days   Patient currently being followed by outpatient case management? No   Patient currently receives home health  services? No   Does the patient currently use HME? Yes   Patient currently receives private duty nursing? N/A   Patient currently receives any other outside agency services? No   Do you have any problems affording any of your prescribed medications? No   Is the patient taking medications as prescribed? yes   Do you have any financial concerns preventing you from receiving the healthcare you need? No   Does the patient have transportation to healthcare appointments? Yes   Transportation Available family or friend will provide   On Dialysis? No   Does the patient receive services at the Coumadin Clinic? No   Are there any open cases? No   Discharge Plan A Return to nursing home   Discharge Plan B Inpatient Hospice   Patient/Family In Agreement With Plan yes

## 2017-05-29 NOTE — DISCHARGE SUMMARY
"DISCHARGE SUMMARY  Hospital Medicine    Team: [facility]    Patient Name: Lianet Draper  YOB: 1917    Admit Date: 5/27/2017    Discharge Date: 05/29/2017    Discharge Attending Physician: Dr. Tamanna Katz    Resident on Service: Dr. Wiley Blackwell    Chief Complaint: Sepsis with SERINA    Princilpal Diagnoses:  Active Hospital Problems    Diagnosis  POA    *Encephalopathy [G93.40]  Yes    Pain [R52]  Yes    Nausea [R11.0]  Yes    Palliative care encounter [Z51.5]  Not Applicable    Goals of care, counseling/discussion [Z71.89]  Not Applicable    Severe sepsis [A41.9, R65.20]  Yes    Acute respiratory failure with hypoxia [J96.01]  Yes    SERINA (acute kidney injury) [N17.9]  Yes    Hypernatremia [E87.0]  Yes    Elevated TSH [R94.6]  Yes    Essential hypertension [I10]  Yes    Osteopenia [M85.80]  Yes      Resolved Hospital Problems    Diagnosis Date Resolved POA   No resolved problems to display.       Discharged Condition: Admit problems have stabilized     HOSPITAL COURSE:      Initial Presentation:    89 yo F with hx of HTN and osteopenia presents to [facility] ED from nursing home w/ complaints of AMS for the past x1 week. Additionally, it was reported that pt was lethargic, "gurgly" and coughing. There have been no reports of fevers or chills, sputum production, syncopal episodes, chest pain, n/v, or bowel irregularities. She was given zithromax prophylactic per daughter at the nursing home because the "flu was going around". On arrival to ED, nursing had difficulty getting adequate pulse oxygenation readings on patient. O2 sats repeatedly low (70s) and patient was placed on BiPAP at 100%. Subsequent ABG, however, reported normal acid-base status with PaO2 >300. During evaluation, Ms. Draper had an episode of emesis and was transitioned off BiPAP to HF. Currently doing fine on HF. ICU was consulted, deemed stable for the floor.     Course of Principle Problem for " Admission:    Patient stated on IV antibiotics as well as IV fluids. Blood cultures obtained during the admission process grew Strep viridans and urine cultures were positive for E. Coli. Initial antibiotic choice was Zosyn in the ED and then ceftriaxone and vancomycin on the Hospital wards. Overnight patient's mentation improved and on day of discharge it had returned to near-baseline per family. She was transitioned to oral Augmentin to finish out a 14 day course for the afore mentioned bacteria. In addition, Palliative Care was consulted after discussions with family had indicated that they wanted to focus on comfort for their mother and avoid any unnecessary procedures or continual labs draws. Patient was made DNR on admission and the decision to transition to hospice at her current nursing home was made. Patient was returned to her nursing home and their Hospice team will be consulted to provide further information and management of the patient's transition to hospice. All questions were answer from the family and the plan reviewed. At time of discharge patient was stable on 5L oxygen; no further blood cultures were obtained to verify clearance based on the focus on comfort measures at that time.     Other Medical Problems Addressed in the Hospital:    Elevated TSH     In pt with normal TSH in the past  Likely 2/2 to sever sepsis/current illness  --will follow up in clinic           Hypernatremia     Likely hypovolemic hypernatremia   Hypovolemic d/t severe sepsis, dec po intake   S/p 30cc/kg   - interval improvement following D5; patient resuming PO intake  - will reassess need for further labs after palliative discussion  - normalized on last BMP       SERINA (acute kidney injury)     - likely 2/2 to severe sepsis d/t UTI vs dehydration/decreased PO intake  - admission Cr 1.6; baseline 0.8-0.9  - s/p 30cc/kg       Acute respiratory failure with hypoxia     - O2 sats 70's on arrival   - 100 SpO2 on ABG in the ED;  inconsistent with pulse oximetry   - Transitioned from BiPAP to HFNC s/p vomiting in mask, currently stable   - continue aspiration prescautions  - will continue to wean off HF (5L at time of admission)       Severe sepsis     2/4 SIRS, source + end organ dmg (lungs, SERINA)  Likely 2/2 to UTI vs PNA vs bacteremia  - UA 5/27: 2+ leukocytes, occasional bacteria, negative nitrites  - UCx 5/28: in process  - BCx 5/27: aerobic and anaerobic positive; gram + cocci in chains resembling Strep  - Repeat BCx 5/29 for clearance evaluation   CXR + for bibasilar effusions, no focal consolidation seen   30cc/kg given  - initial lactic acid 3.0>1.9 5/28  - continue vanc/rocephin  - DNR/DNI, seen by CC and determined stable for the floor  - palliative care consulted; appreciate recs       Osteopenia                Essential hypertension     Will not order home dose medications 2/2 to ongoing concern for sepsis             * Encephalopathy     Likely 2/2 to severe sepsis d/t UTI vs hypernatremia vs hypothyroidism  - improving; patient oriented to self             CONSULTS: Palliative Care    Last CBC/BMP/HgbA1c (if applicable):  Recent Results (from the past 336 hour(s))   CBC with Automated Differential    Collection Time: 05/29/17  3:47 AM   Result Value Ref Range    WBC 10.89 3.90 - 12.70 K/uL    Hemoglobin 11.2 (L) 12.0 - 16.0 g/dL    Hematocrit 35.1 (L) 37.0 - 48.5 %    Platelets 122 (L) 150 - 350 K/uL   CBC with Automated Differential    Collection Time: 05/28/17  4:38 AM   Result Value Ref Range    WBC 10.89 3.90 - 12.70 K/uL    Hemoglobin 13.5 12.0 - 16.0 g/dL    Hematocrit 43.3 37.0 - 48.5 %    Platelets 166 150 - 350 K/uL   CBC auto differential    Collection Time: 05/27/17  8:18 PM   Result Value Ref Range    WBC 10.00 3.90 - 12.70 K/uL    Hemoglobin 14.1 12.0 - 16.0 g/dL    Hematocrit 44.8 37.0 - 48.5 %    Platelets 172 150 - 350 K/uL     Recent Results (from the past 336 hour(s))   Basic metabolic panel    Collection  Time: 05/28/17  8:55 PM   Result Value Ref Range    Sodium 145 136 - 145 mmol/L    Potassium 3.6 3.5 - 5.1 mmol/L    Chloride 111 (H) 95 - 110 mmol/L    CO2 18 (L) 23 - 29 mmol/L    BUN, Bld 54 (H) 10 - 30 mg/dL    Creatinine 1.6 (H) 0.5 - 1.4 mg/dL    Calcium 8.2 (L) 8.7 - 10.5 mg/dL    Anion Gap 16 8 - 16 mmol/L     Lab Results   Component Value Date    HGBA1C 5.8 01/17/2007       Other Pertinent Lab Findings:  Positive blood cultures; UA with signs of infection    Pertinent/Significant Diagnostic Studies:  CT Head without acute process or ICH.    Special Treatments/Procedures: N/A    Disposition:  SNF       Discharge Medication List:     Medication List      START taking these medications    amoxicillin-clavulanate 500-125mg 500-125 mg Tab  Commonly known as:  AUGMENTIN  Take 1 tablet (500 mg total) by mouth 2 (two) times daily.        CONTINUE taking these medications    losartan-hydrochlorothiazide 50-12.5 mg 50-12.5 mg per tablet  Commonly known as:  HYZAAR     mirtazapine 30 MG tablet  Commonly known as:  REMERON     multivitamin capsule     VITAMIN D2 50,000 unit Cap  Generic drug:  ergocalciferol           Where to Get Your Medications      [facility] [address] [address]    Phone:  [phone]    amoxicillin-clavulanate 500-125mg 500-125 mg Tab         Patient Instructions:  No discharge procedures on file.    At the time of discharge patient was told to take all medications as prescribed, to keep all followup appointments, and to call their primary care physician or return to the emergency room if they have any worsening or concerning symptoms.    Signing Physician:  Wiley Blackwell MD

## 2017-05-29 NOTE — PROGRESS NOTES
"[facility] Hospital Medicine  Progress Note    Patient Name: Lianet Draper  MRN: [id number]  Patient Class: IP- Inpatient   Admission Date: 5/27/2017  Length of Stay: 2 days  Attending Physician: Tamanna Katz MD  Primary Care Provider: BAR Crow MD    [facility] Medicine Team: [facility] HOSP MED 1 Wiley Blackwell MD    Subjective:     Principal Problem:Encephalopathy    HPI:  89 yo F with hx of HTN and osteopenia presents to [facility] from ED from nursing home w/ complaints of AMS for the past x1 week. Additionally, it was reported that pt was lethargic, "gurgly" and coughing. There have been no reports of fevers or chills, sputum production, syncopal episodes, chest pain, n/v, or bowel irregularities. She was given zithromax prophylactic per daughter at the nursing home because the "flu was going around".     On arrival to ED, nursing had difficulty getting adequate pulse oxygenation readings on patient. O2 sats repeatedly low (70s) and patient was placed on BiPAP at 100%. Subsequent ABG, however, reported normal acid-base status with PaO2 >300. During evaluation, Ms. Draper had an episode of emesis and was transitioned off BiPAP to HF. Currently doing fine on HF.     ICU was consulted, deemed stable for the floor.     Hospital Course:  5/29/17: NAEON; patient afebrile. Issues with pulse ox readings throughout the night. Patient somnolent this morning with minimal response to name or sternal rub. Currently on 10L HF. More awake during rounds; currently tolerating PO feeds but minimal amount. Palliative to see patient this afternoon.     Interval History: NAEON; patient afebrile. Issues with pulse ox readings throughout the night. Patient somnolent this morning with minimal response to name or sternal rub. Currently on 10L HF.    Review of Systems   Unable to perform ROS: Mental status change     Objective:     Vital Signs (Most Recent):  Temp: 97.1 °F (36.2 °C) (05/29/17 0400)  Pulse: 95 " (05/29/17 0400)  Resp: 18 (05/29/17 0400)  BP: (!) 119/52 (05/29/17 0400)  SpO2: (!) 81 % (05/29/17 0400) Vital Signs (24h Range):  Temp:  [97.1 °F (36.2 °C)-98.3 °F (36.8 °C)] 97.1 °F (36.2 °C)  Pulse:  [] 95  Resp:  [16-20] 18  SpO2:  [32 %-91 %] 81 %  BP: ()/(50-62) 119/52     Weight: 41.5 kg (91 lb 7.9 oz)  Body mass index is 17.87 kg/m².    Intake/Output Summary (Last 24 hours) at 05/29/17 0623  Last data filed at 05/28/17 1359   Gross per 24 hour   Intake              750 ml   Output                0 ml   Net              750 ml      Physical Exam   Constitutional: No distress.   HENT:   Head: Atraumatic.   Mouth/Throat: No oropharyngeal exudate.   Eyes: EOM are normal. Pupils are equal, round, and reactive to light. No scleral icterus.   Neck: Normal range of motion. No JVD present.   Cardiovascular: Regular rhythm, normal heart sounds and intact distal pulses.  Exam reveals no friction rub.    No murmur heard.  Tachycardic     Pulmonary/Chest: Effort normal. No respiratory distress. She has no wheezes. She has rales.   Abdominal: Soft. Bowel sounds are normal. She exhibits no distension. There is no tenderness. There is no rebound and no guarding.   Musculoskeletal: Normal range of motion. She exhibits no edema or tenderness.   Lymphadenopathy:     She has no cervical adenopathy.   Neurological:   Patient awake after persistent attempts; oriented to name only.    Skin: Skin is warm.       Significant Labs:   CBC:   Recent Labs  Lab 05/27/17 2018 05/28/17 0438 05/29/17  0347   WBC 10.00 10.89 10.89   HGB 14.1 13.5 11.2*   HCT 44.8 43.3 35.1*    166 122*     CMP:   Recent Labs  Lab 05/27/17 2018 05/28/17  0438 05/28/17  2055   * 153* 145   K 4.2 4.2 3.6    115* 111*   CO2 22* 21* 18*   * 130* 161*   BUN 57* 55* 54*   CREATININE 1.6* 1.5* 1.6*   CALCIUM 9.9 8.7 8.2*   PROT 7.9 6.6  --    ALBUMIN 3.6 2.9*  --    BILITOT 1.1* 0.8  --    ALKPHOS 69 55  --    AST 41* 40  --     ALT 16 15  --    ANIONGAP 20* 17* 16   EGFRNONAA 26.4* 28.6* 26.4*       Significant Imaging: I have reviewed all pertinent imaging results/findings within the past 24 hours.    Assessment/Plan:      Elevated TSH    In pt with normal TSH in the past  Likely 2/2 to sever sepsis/current illness  --will follow up in clinic           Hypernatremia    Likely hypovolemic hypernatremia   Hypovolemic d/t severe sepsis, dec po intake   S/p 30cc/kg   - interval improvement following D5; patient resuming PO intake  - will reassess need for further labs after palliative discussion          SERINA (acute kidney injury)    - likely 2/2 to severe sepsis d/t UTI vs dehydration/decreased PO intake  - admission Cr 1.6; baseline 0.8-0.9  - s/p 30cc/kg        Acute respiratory failure with hypoxia    - O2 sats 70's on arrival   - 100 SpO2 on ABG in the ED; inconsistent with pulse oximetry   - Transitioned from BiPAP to HFNC s/p vomiting in mask, currently stable   - continue aspiration prescautions  - will continue to wean off HF        Severe sepsis    2/4 SIRS, source + end organ dmg (lungs, SERINA)  Likely 2/2 to UTI vs PNA vs bacteremia  - UA 5/27: 2+ leukocytes, occasional bacteria, negative nitrites  - UCx 5/28: in process  - BCx 5/27: aerobic and anaerobic positive; gram + cocci in chains resembling Strep  - Repeat BCx 5/29 for clearance evaluation   CXR + for bibasilar effusions, no focal consolidation seen   30cc/kg given  - initial lactic acid 3.0>1.9 5/28  - continue vanc/rocephin  - DNR/DNI, seen by CC and determined stable for the floor  - palliative care consulted; appreciate recs        Osteopenia              Essential hypertension    Will not order home dose medications 2/2 to ongoing concern for sepsis            * Encephalopathy    Likely 2/2 to severe sepsis d/t UTI vs hypernatremia vs hypothyroidism  - improving; patient oriented to self              VTE Risk Mitigation         Ordered     Medium Risk of VTE  Once       05/28/17 0201     Place sequential compression device  Until discontinued      05/28/17 0201          Wiley Blackwell MD  Department of Hospital Medicine   [facility]

## 2017-05-29 NOTE — CONSULTS
Palliative Care Acknowledgement of Consult - .date 5/29/17 8 AM     Consult received.Patient discussed with Dr. Blackwell.  Will meet with family to discuss goals of care at 1:30 PM 5/29/17.  Full consult to follow.    Thank you for the opportunity to participate in Ms. Draper's care.   LUKE Ann, ACNS-BC, OCN   Palliative Medicine,[facility] link [phone]

## 2017-05-29 NOTE — PLAN OF CARE
Problem: Fall Risk (Adult)  Goal: Absence of Falls  Patient will demonstrate the desired outcomes by discharge/transition of care.   Outcome: Outcome(s) achieved Date Met: 05/29/17  Pt is free of falls and injuries at the time of discharge .

## 2017-05-29 NOTE — ASSESSMENT & PLAN NOTE
Likely 2/2 to severe sepsis d/t UTI vs hypernatremia vs hypothyroidism  - improving; patient oriented to self

## 2017-05-29 NOTE — CONSULTS
Consult Note  Palliative Care      Consult Requested By: Tamanna Katz MD  Reason for Consult: end of life/ hospice   Chart reviewed and patient discussed with Dr. Blackwell IM 1 Resident       ASSESSMENT/PLAN:       Impression: Ms. Draper is a 89 yr old lady admitted from [facility] with altered mental status, severe sepsis and acute hypoxic respiratory failure. She arouses to voice,  Oriented to person and place.  States no pain or shortness of breath.  Oxygen at 10 liters nasal canula.  No acute distress.                         Goals of Care:    Ms. Wetzel has an advanced directive.  Her daughters are at bedside.  A family meeting was held per palliative care   APRN and LEVON JUAREZW  with Daughters Tamra Moreno and Gabby Moe to discuss goals of care.   -DNR orders written per primary team   - Family in agreement to transition to comfort care - hospice.  Family has chosen hospice services at the [facility] where she is currently a resident in the nursing home.  The family states it is very nice and will meet her comfort needs and she is already familiar with the staff.     Symptom Management:  Patient currently has no complaints of pain or discomfort       Nausea: recommend continuing   Ondansetron 4mg IVP two times daily as needed for nausea  Pain: recommend continuing   Acetominophen 325 mg by mouth every 4 hrs as needed for nausea       Plan/Recommendations:  1. Palliative care will continue to follow and assist family with goals of care.   2. Symptom management recommendations  as above   3. Consult with /case management for hospice consult - patient has chosen to transition to [facility] hospice. Where she is currently a resident at the nursing home    5.  Anointing of the sick.  Spiritual care notified   4.  [place] on discharge.   Copy in blue chart. Gold copy to be given to patient and copy to be scanned into EMR  5..  Emotional support  "      Dr. Katz and Cal RN CM team notified of  notified of above recommendations.  Thank you for opportunity to participate in Mr./ MsEkaterina care.        Signature: LUKE Ann, ACNS-BC, OCN  Palliative Medicine [facility] link [phone]    > 50% of  70  min visit spent in chart review, face to face discussion of goals of care,  symptom assessment, coordination of care and emotional support.      SUBJECTIVE:     History of Present Illness: Mrs. Draper is a 89 yo lady with PMH of  HTN, cardomegaly, osteopenia and fall at home.  Presented to [facility] from [facility] with c/o  AMS (1 wk). Reported that Ms. Draper had also been lethargic, "gurgly" and coughing.  No reports of fevers or chills, sputum production, syncopal episodes, chest pain, n/v, or bowel irregularities. Daughter gave zithromax prophylactic  As the "flu was going around" in the nursing home. around".  In ED difficulty getting adequate pulse oxygenation readings on patient. O2 sats repeatedly low (70s) and patient was placed on BiPAP at 100%. Subsequent ABG, normal acid-base status with PaO2 >300. Ms. Draper had an episode of emesis and was transitioned off BiPAP to HF oxygen.. Currently doing fine on HF.        Past Medical History:   Diagnosis Date    Cardiomegaly     Essential hypertension 9/20/2012    Fracture of right iliac wing 7/28/2016    Fracture of right inferior pubic ramus 7/28/2016    Fracture of right superior pubic ramus 7/28/2016    Multiple closed stable lateral compression fractures of pelvis 7/29/2016    Osteopenia 9/20/2012    Pneumonia      Past Surgical History:   Procedure Laterality Date    HYSTERECTOMY       History reviewed. No pertinent family history.  Review of patient's allergies indicates:   Allergen Reactions    Codeine        Medications:  Continuous Infusions:    dextrose 5 % 1,000 mL (05/29/17 2583)     Scheduled:    cefTRIAXone (ROCEPHIN) IVPB  1 g Intravenous Q24H    heparin (porcine)  5,000 " Units Subcutaneous Q8H    vancomycin (VANCOCIN) IVPB  15 mg/kg Intravenous Q24H     PRN Meds: acetaminophen, dextrose 50%, dextrose 50%, glucagon (human recombinant), glucose, glucose, ondansetron    24h Oral Morphine Equivalents (OME): 0    Bowel Management Plan (BMP): Yes (  )  NO  (X )    OBJECTIVE:   Symptom Assessment (ESAS 0-10 scale)     ESAS 0 1 2 3 4 5 6 7 8 9 10   Pain X             Dyspnea X             Anxiety X             Nausea X             Depression  X             Anorexia X             Fatigue X             Insomnia X             Restlessness  X             Agitation X             Constipation     __ --  ___+   Diarrhea           __ --  ___+     Comments States no pain or shortness of breath.        Perfromance Status: PPS Score (30 )       Physical Exam:  Vitals reviewed :   General: Afebrile,alert, comfortable, no acute distress.   Pulmonary: Non labored,clear to auscultation anteriorly.   Cardiac: normal S1 & S2 w/o rubs/murmurs/gallops.   Abdominal: Non-tender, non-distended.Bowel sounds present x 4. No appreciable hepatosplenomegaly. No guarding or rebound tenderness.   Extremities: Moves all extremities x 4. No peripheral edema. 2+ pulses.  Skin: No jaundice, rashes, or visible lesions.  Neurological: arouses with verbal stimuli, oriented to person and place, drowsy No focal neuro deficits.   Psych/Mental Status: calm, quiet, mood and behavior normal   CAM / Delirium __ --  ___+   FAST Stage for Dementia: Unable to assess - patient falls to sleep quickly       Labs: reviewed   CBC:   WBC   Date Value Ref Range Status   05/29/2017 10.89 3.90 - 12.70 K/uL Final     Hemoglobin   Date Value Ref Range Status   05/29/2017 11.2 (L) 12.0 - 16.0 g/dL Final     Hematocrit   Date Value Ref Range Status   05/29/2017 35.1 (L) 37.0 - 48.5 % Final     MCV   Date Value Ref Range Status   05/29/2017 97 82 - 98 fL Final     Platelets   Date Value Ref Range Status   05/29/2017 122 (L) 150 - 350 K/uL Final  "      BMP:   Recent Labs  Lab 05/28/17 2055   *      K 3.6   *   CO2 18*   BUN 54*   CREATININE 1.6*   CALCIUM 8.2*       LFT: Lab Results   Component Value Date    AST 40 05/28/2017    ALKPHOS 55 05/28/2017    BILITOT 0.8 05/28/2017       Albumin:   Albumin   Date Value Ref Range Status   05/28/2017 2.9 (L) 3.5 - 5.2 g/dL Final     Protein:   Total Protein   Date Value Ref Range Status   05/28/2017 6.6 6.0 - 8.4 g/dL Final       LACTIC ACID:   Lab Results   Component Value Date    LACTATE 1.9 05/28/2017    LACTATE 3.3 (H) 05/27/2017       Radiology: reviewed     Legal/Advanced Directives: Received   Living Will:   Resuscitate Status: DNR  Decision-Making Capacity:  Oriented to person and place   Medical Power of : 1st Bryce Vasquez 11, second Gabby Vasquez [facility] [phone]    Psychosocial/Cultural: ,  three times, 3 children (one son and two daughters) 5 grandchildren and 2 great grand children.  Worked as a  for several yrs.  Daughters describe her as a very kind and generous woman who deflected adversity.  Daughters state she was always positive as if she had a "madhu gene"     Spiritual:     F- Lulu and Belief: Synagogue     I - Importance: attended Mass regularly   .  C - Community    A - Address in Care: Requested anointing of the sick.  Spiritual care notified and put on schedule       Problem list:  Active Hospital Problems    Diagnosis  POA    *Encephalopathy [G93.40]  Yes    Severe sepsis [A41.9, R65.20]  Yes    Acute respiratory failure with hypoxia [J96.01]  Yes    SERINA (acute kidney injury) [N17.9]  Yes    Hypernatremia [E87.0]  Yes    Elevated TSH [R94.6]  Yes    Essential hypertension [I10]  Yes    Osteopenia [M85.80]  Yes      Resolved Hospital Problems    Diagnosis Date Resolved POA   No resolved problems to display.     "

## 2017-05-29 NOTE — PROGRESS NOTES
Consult received on patient. Stage 2 pressure injury noted to left buttocks. Patient incontinent, recommend applying zinc barrier cream BID and as needed. Remove only soiled cream then reapply more. Patient daughter at bedside, reports patient sits in wheelchair most of the time. Wheel chair cushion ordered for patient. Turn every 2hrs while in bed. Nursing to continue care.         05/29/17 1131       Pressure Ulcer 05/28/17 0155 Left buttocks Stage II   Date First Assessed/Time First Assessed: 05/28/17 0155   Pressure Ulcer Present on Admission: yes  Side: Left  Location: buttocks  Staging: Stage II   Staging Stage II   Pressure Ulcer Risk Factors activity;mobility;nutrition;shear/friction;moisture   Drainage Amount none   Drainage Characteristics/Odor no odor   Appearance moist;pink   Periwound Area redness   Wound Edges open   Wound Length (cm) 1   Wound Width (cm) 1   Depth (cm) 0   Interventions barrier applied

## 2017-05-29 NOTE — PT/OT/SLP EVAL
Speech Language Pathology  Evaluation    Lianet Draper   MRN: [id number]   Admitting Diagnosis: Encephalopathy    Diet recommendations: Solid Diet Level: NPO  Liquid Diet Level: NPO     SLP Treatment Date: 05/29/17  Speech Start Time: 0927     Speech Stop Time: 0935     Speech Total (min): 8 min       TREATMENT BILLABLE MINUTES:  Eval Swallow and Oral Function 8    Diagnosis: Encephalopathy      Past Medical History:   Diagnosis Date    Cardiomegaly     Essential hypertension 9/20/2012    Fracture of right iliac wing 7/28/2016    Fracture of right inferior pubic ramus 7/28/2016    Fracture of right superior pubic ramus 7/28/2016    Multiple closed stable lateral compression fractures of pelvis 7/29/2016    Osteopenia 9/20/2012    Pneumonia      Past Surgical History:   Procedure Laterality Date    HYSTERECTOMY         Has the patient been evaluated by SLP for swallowing? : Yes  Keep patient NPO?: Yes   General Precautions: Standard,            Prior diet: No previous SLP notes; no reported restrictions        Subjective:  Pt benefited from moderate stimulation to remain alert and to participate in assessment  Patient goals: did not state    Pain/Comfort  Pain Rating 1: 0/10  Pain Rating Post-Intervention 1: 0/10    Objective:        Oral Musculature Evaluation  Oral Musculature: unable to assess due to poor participation/comprehension  Dentition: edentulous  Volitional Swallow: did not demonstrate  Voice Prior to PO Intake: clear     Bedside Swallow Eval:  Consistencies Assessed: Thin liquids 2 single ice chips, 1 1/2 spoonful thin, and Puree 2, 1/4 spoonfuls  Oral Phase: poor oral acceptance; turning head; removed bolus of puree from oral cavity once placed into mouth; expectorated 1 ice chip onto bed  Pharyngeal Phase: multiple swallows on thin liquid via spoon; no swallow on 1 ice chip; all other trials did not pass to pharynx    Additional Treatment:    Pt demonstrated poor awareness to task and is not  a candidate for PO at this time. Skilled education provided on aspiration precautions and diet recommendations. Whiteboard updated with diet recommendations/aspiration precautions. No further questions.                                     Assessment:  Lianet Draper is a 89 y.o. female with a medical diagnosis of Encephalopathy and presents with oropharyngeal dysphagia    Do you have any cultural, spiritual, Sikhism conflicts, given your current situation?: no     Discharge recommendations: Discharge Facility/Level Of Care Needs: nursing facility, basic     Goals:    SLP Goals        Problem: SLP Goal    Goal Priority Disciplines Outcome   SLP Goal     SLP    Description:  Speech Language Pathology Goals  Goals expected to be met by 6/5  1. Pt will participate in ongoing swallow assessment to determine least restrictive diet.                           Plan:   Patient to be seen Therapy Frequency: 5 x/week   Plan of Care expires: 06/27/17  Plan of Care reviewed with: patient  SLP Follow-up?: Yes             Melissa Chau M.A. CCC-SLP  Speech Language Pathologist  [phone]  5/29/2017

## 2017-05-29 NOTE — HOSPITAL COURSE
5/29/17: NAEON; patient afebrile. Issues with pulse ox readings throughout the night. Patient somnolent this morning with minimal response to name or sternal rub. Currently on 10L HF. More awake during rounds; currently tolerating PO feeds but minimal amount. Palliative to see patient this afternoon.

## 2017-05-29 NOTE — PROGRESS NOTES
Report given to the nurse Regina in [facility] . waiting for  transportation .

## 2017-05-30 ENCOUNTER — PATIENT OUTREACH (OUTPATIENT)
Dept: ADMINISTRATIVE | Facility: CLINIC | Age: 82
End: 2017-05-30
Payer: MEDICARE

## 2017-05-30 LAB — BACTERIA UR CULT: NORMAL

## 2017-05-30 NOTE — CHAPLAIN
visited patient in order to respond to palliative care consult.   provided compassionate presence and prayer for patient and family.   also provided reflective listening for family.

## 2017-05-31 LAB
BACTERIA BLD CULT: NORMAL

## 2017-06-01 LAB — BACTERIA BLD CULT: NORMAL
